# Patient Record
Sex: MALE
[De-identification: names, ages, dates, MRNs, and addresses within clinical notes are randomized per-mention and may not be internally consistent; named-entity substitution may affect disease eponyms.]

---

## 2017-07-22 ENCOUNTER — HOSPITAL ENCOUNTER (OUTPATIENT)
Dept: HOSPITAL 57 - BURLAB | Age: 57
Discharge: HOME | End: 2017-07-22
Attending: PHYSICIAN ASSISTANT
Payer: COMMERCIAL

## 2017-07-22 DIAGNOSIS — E78.2: Primary | ICD-10-CM

## 2017-07-22 LAB
ALBUMIN SERPL BCG-MCNC: 4.4 G/DL (ref 3.5–5)
ALP SERPL-CCNC: 144 U/L (ref 40–150)
ALT SERPL W P-5'-P-CCNC: 31 U/L (ref 8–55)
ANION GAP SERPL CALC-SCNC: 16 MMOL/L (ref 10–20)
AST SERPL-CCNC: 27 U/L (ref 5–34)
BASOPHILS # BLD AUTO: 0.1 THOU/UL (ref 0–0.2)
BASOPHILS NFR BLD AUTO: 1.9 % (ref 0–1)
BILIRUB SERPL-MCNC: 0.6 MG/DL (ref 0.2–1.2)
BUN SERPL-MCNC: 16 MG/DL (ref 8.4–25.7)
CALCIUM SERPL-MCNC: 9.9 MG/DL (ref 7.8–10.44)
CHD RISK SERPL-RTO: 4.4 (ref ?–4.5)
CHLORIDE SERPL-SCNC: 106 MMOL/L (ref 98–107)
CHOLEST SERPL-MCNC: 169 MG/DL
CO2 SERPL-SCNC: 26 MMOL/L (ref 22–29)
CREAT CL PREDICTED SERPL C-G-VRATE: 0 ML/MIN (ref 70–130)
EOSINOPHIL # BLD AUTO: 0.1 THOU/UL (ref 0–0.7)
EOSINOPHIL NFR BLD AUTO: 2.6 % (ref 0–10)
GLOBULIN SER CALC-MCNC: 2.6 G/DL (ref 2.4–3.5)
GLUCOSE SERPL-MCNC: 107 MG/DL (ref 70–105)
HDLC SERPL-MCNC: 38 MG/DL
HGB BLD-MCNC: 14.6 G/DL (ref 14–18)
LDLC SERPL CALC-MCNC: 90 MG/DL
LYMPHOCYTES # BLD AUTO: 1.2 THOU/UL (ref 1.2–3.4)
LYMPHOCYTES NFR BLD AUTO: 22.1 % (ref 21–51)
MCH RBC QN AUTO: 30 PG (ref 27–31)
MCV RBC AUTO: 86.2 FL (ref 80–94)
MONOCYTES # BLD AUTO: 0.4 THOU/UL (ref 0.11–0.59)
MONOCYTES NFR BLD AUTO: 6.7 % (ref 0–10)
NEUTROPHILS # BLD AUTO: 3.7 THOU/UL (ref 1.4–6.5)
NEUTROPHILS NFR BLD AUTO: 66.7 % (ref 42–75)
PLATELET # BLD AUTO: 241 THOU/UL (ref 130–400)
POTASSIUM SERPL-SCNC: 4.7 MMOL/L (ref 3.5–5.1)
RBC # BLD AUTO: 4.86 MILL/UL (ref 4.7–6.1)
SODIUM SERPL-SCNC: 143 MMOL/L (ref 136–145)
TRIGL SERPL-MCNC: 203 MG/DL (ref ?–150)
WBC # BLD AUTO: 5.5 THOU/UL (ref 4.8–10.8)

## 2017-07-22 PROCEDURE — 80061 LIPID PANEL: CPT

## 2017-07-22 PROCEDURE — 36415 COLL VENOUS BLD VENIPUNCTURE: CPT

## 2017-07-22 PROCEDURE — 85025 COMPLETE CBC W/AUTO DIFF WBC: CPT

## 2017-07-22 PROCEDURE — 84443 ASSAY THYROID STIM HORMONE: CPT

## 2017-07-22 PROCEDURE — 80053 COMPREHEN METABOLIC PANEL: CPT

## 2017-07-27 ENCOUNTER — HOSPITAL ENCOUNTER (OUTPATIENT)
Dept: HOSPITAL 57 - HPCALD | Age: 57
Discharge: HOME | End: 2017-07-27
Attending: PHYSICIAN ASSISTANT
Payer: COMMERCIAL

## 2017-07-27 DIAGNOSIS — Z12.5: Primary | ICD-10-CM

## 2017-07-27 PROCEDURE — G0103 PSA SCREENING: HCPCS

## 2017-07-27 PROCEDURE — 36415 COLL VENOUS BLD VENIPUNCTURE: CPT

## 2021-01-29 ENCOUNTER — HOSPITAL ENCOUNTER (INPATIENT)
Dept: HOSPITAL 92 - ERS | Age: 61
LOS: 9 days | Discharge: HOME | DRG: 871 | End: 2021-02-07
Attending: INTERNAL MEDICINE | Admitting: INTERNAL MEDICINE
Payer: COMMERCIAL

## 2021-01-29 ENCOUNTER — HOSPITAL ENCOUNTER (EMERGENCY)
Dept: HOSPITAL 57 - BURERS | Age: 61
Discharge: ANOTHER HOSPITAL | End: 2021-01-29
Payer: COMMERCIAL

## 2021-01-29 VITALS — BODY MASS INDEX: 34.8 KG/M2

## 2021-01-29 DIAGNOSIS — E11.22: ICD-10-CM

## 2021-01-29 DIAGNOSIS — Z79.899: ICD-10-CM

## 2021-01-29 DIAGNOSIS — E11.65: ICD-10-CM

## 2021-01-29 DIAGNOSIS — E66.9: ICD-10-CM

## 2021-01-29 DIAGNOSIS — I12.9: ICD-10-CM

## 2021-01-29 DIAGNOSIS — R65.20: ICD-10-CM

## 2021-01-29 DIAGNOSIS — A41.89: Primary | ICD-10-CM

## 2021-01-29 DIAGNOSIS — I10: ICD-10-CM

## 2021-01-29 DIAGNOSIS — Z79.84: ICD-10-CM

## 2021-01-29 DIAGNOSIS — J96.01: ICD-10-CM

## 2021-01-29 DIAGNOSIS — E78.5: ICD-10-CM

## 2021-01-29 DIAGNOSIS — Z98.2: ICD-10-CM

## 2021-01-29 DIAGNOSIS — U07.1: Primary | ICD-10-CM

## 2021-01-29 DIAGNOSIS — E87.5: ICD-10-CM

## 2021-01-29 DIAGNOSIS — Z79.82: ICD-10-CM

## 2021-01-29 DIAGNOSIS — U07.1: ICD-10-CM

## 2021-01-29 DIAGNOSIS — E11.9: ICD-10-CM

## 2021-01-29 DIAGNOSIS — R06.03: ICD-10-CM

## 2021-01-29 DIAGNOSIS — N18.2: ICD-10-CM

## 2021-01-29 DIAGNOSIS — J12.82: ICD-10-CM

## 2021-01-29 LAB
ALBUMIN SERPL BCG-MCNC: 4.1 G/DL (ref 3.5–5)
ALP SERPL-CCNC: 105 U/L (ref 40–110)
ALT SERPL W P-5'-P-CCNC: 17 U/L (ref 8–55)
ANION GAP SERPL CALC-SCNC: 16 MMOL/L (ref 10–20)
APTT PPP: 34.9 SEC (ref 22.9–36.1)
AST SERPL-CCNC: 27 U/L (ref 5–34)
BASOPHILS # BLD AUTO: 0.1 THOU/UL (ref 0–0.2)
BASOPHILS NFR BLD AUTO: 0.7 % (ref 0–1)
BILIRUB SERPL-MCNC: 0.8 MG/DL (ref 0.2–1.2)
BUN SERPL-MCNC: 23 MG/DL (ref 8.4–25.7)
CALCIUM SERPL-MCNC: 9.3 MG/DL (ref 7.8–10.44)
CHLORIDE SERPL-SCNC: 99 MMOL/L (ref 98–107)
CO2 SERPL-SCNC: 25 MMOL/L (ref 22–29)
CREAT CL PREDICTED SERPL C-G-VRATE: 0 ML/MIN (ref 70–130)
D DIMER PPP FEU-MCNC: 1.68 *MCG/ML (ref 0.27–0.43)
EOSINOPHIL # BLD AUTO: 0 THOU/UL (ref 0–0.7)
EOSINOPHIL NFR BLD AUTO: 0.1 % (ref 0–10)
GLOBULIN SER CALC-MCNC: 2.9 G/DL (ref 2.4–3.5)
GLUCOSE SERPL-MCNC: 126 MG/DL (ref 70–105)
HGB BLD-MCNC: 13.2 G/DL (ref 14–18)
INR PPP: 1.1
LYMPHOCYTES # BLD AUTO: 0.7 THOU/UL (ref 1.2–3.4)
LYMPHOCYTES NFR BLD AUTO: 5.5 % (ref 21–51)
MCH RBC QN AUTO: 29.1 PG (ref 27–31)
MCV RBC AUTO: 87.2 FL (ref 78–98)
MONOCYTES # BLD AUTO: 0.4 THOU/UL (ref 0.11–0.59)
MONOCYTES NFR BLD AUTO: 3.6 % (ref 0–10)
NEUTROPHILS # BLD AUTO: 10.7 THOU/UL (ref 1.4–6.5)
NEUTROPHILS NFR BLD AUTO: 90.2 % (ref 42–75)
PLATELET # BLD AUTO: 216 THOU/UL (ref 130–400)
POTASSIUM SERPL-SCNC: 3.6 MMOL/L (ref 3.5–5.1)
PROTHROMBIN TIME: 14.6 SEC (ref 12–14.7)
RBC # BLD AUTO: 4.55 MILL/UL (ref 4.7–6.1)
SODIUM SERPL-SCNC: 136 MMOL/L (ref 136–145)
WBC # BLD AUTO: 11.8 THOU/UL (ref 4.8–10.8)

## 2021-01-29 PROCEDURE — 96374 THER/PROPH/DIAG INJ IV PUSH: CPT

## 2021-01-29 PROCEDURE — 36416 COLLJ CAPILLARY BLOOD SPEC: CPT

## 2021-01-29 PROCEDURE — 83605 ASSAY OF LACTIC ACID: CPT

## 2021-01-29 PROCEDURE — 85379 FIBRIN DEGRADATION QUANT: CPT

## 2021-01-29 PROCEDURE — 85025 COMPLETE CBC W/AUTO DIFF WBC: CPT

## 2021-01-29 PROCEDURE — 80076 HEPATIC FUNCTION PANEL: CPT

## 2021-01-29 PROCEDURE — 71045 X-RAY EXAM CHEST 1 VIEW: CPT

## 2021-01-29 PROCEDURE — 96375 TX/PRO/DX INJ NEW DRUG ADDON: CPT

## 2021-01-29 PROCEDURE — 80053 COMPREHEN METABOLIC PANEL: CPT

## 2021-01-29 PROCEDURE — 93005 ELECTROCARDIOGRAM TRACING: CPT

## 2021-01-29 PROCEDURE — 82728 ASSAY OF FERRITIN: CPT

## 2021-01-29 PROCEDURE — 87040 BLOOD CULTURE FOR BACTERIA: CPT

## 2021-01-29 PROCEDURE — 85610 PROTHROMBIN TIME: CPT

## 2021-01-29 PROCEDURE — 71275 CT ANGIOGRAPHY CHEST: CPT

## 2021-01-29 PROCEDURE — 84484 ASSAY OF TROPONIN QUANT: CPT

## 2021-01-29 PROCEDURE — 86140 C-REACTIVE PROTEIN: CPT

## 2021-01-29 PROCEDURE — 85730 THROMBOPLASTIN TIME PARTIAL: CPT

## 2021-01-29 PROCEDURE — 36415 COLL VENOUS BLD VENIPUNCTURE: CPT

## 2021-01-29 PROCEDURE — 80048 BASIC METABOLIC PNL TOTAL CA: CPT

## 2021-01-29 PROCEDURE — 83735 ASSAY OF MAGNESIUM: CPT

## 2021-01-30 PROCEDURE — 8E0ZXY6 ISOLATION: ICD-10-PCS | Performed by: EMERGENCY MEDICINE

## 2021-01-30 PROCEDURE — XW033E5 INTRODUCTION OF REMDESIVIR ANTI-INFECTIVE INTO PERIPHERAL VEIN, PERCUTANEOUS APPROACH, NEW TECHNOLOGY GROUP 5: ICD-10-PCS | Performed by: INTERNAL MEDICINE

## 2021-01-30 RX ADMIN — ASPIRIN 81 MG CHEWABLE TABLET SCH MG: 81 TABLET CHEWABLE at 09:10

## 2021-01-30 RX ADMIN — Medication SCH MG: at 09:10

## 2021-01-30 RX ADMIN — ALBUTEROL SULFATE SCH PUFF: 108 INHALANT RESPIRATORY (INHALATION) at 17:37

## 2021-01-30 RX ADMIN — ALBUTEROL SULFATE SCH: 108 INHALANT RESPIRATORY (INHALATION) at 17:37

## 2021-01-30 RX ADMIN — ALBUTEROL SULFATE SCH PUFF: 108 INHALANT RESPIRATORY (INHALATION) at 22:30

## 2021-01-30 RX ADMIN — INSULIN LISPRO PRN UNITS: 100 INJECTION, SOLUTION INTRAVENOUS; SUBCUTANEOUS at 05:43

## 2021-01-30 RX ADMIN — Medication SCH UNITS: at 09:10

## 2021-01-30 NOTE — CT
CT ANGIO OF THE CHEST:

 

DATE: 1/29/2021.

 

FINDINGS: 

Spiral CT of the chest was done on this patient with shortness of breath, elevated D-dimer, and a pos
itive COVID test.  

 

Diffuse moderately severe ground-glass and confluent infiltrates are present bilaterally.  These are 
seen in all lobes, a little denser in the lung bases and elsewhere, but there is also some extensive 
coverage in the right upper lobe.  No effusions are seen.

 

There is moderately good opacification of the pulmonary arteries.  No emboli were seen throughout the
 primary and secondary branches of the pulmonary arteries.  As one gets much further out than that, t
he sensitivity of the study decreases.  There is no sign of aortic aneurysm or dissection.  No perica
rdial effusion was seen.  A small hiatal hernia was suggested.  The visible potions of the upper abdo
men showed no acute findings.  

 

IMPRESSION: 

1.  Moderate sensitivity study with no evidence of pulmonary embolism in the larger branches.

 

2.  Diffuse moderately severe parenchymal infiltrates consistent with a known COVID diagnosis.

 

Findings discussed with Dr. Mcallister at 2153 on 1/29/2021.

 

CODE CR

 

POS: HOME

## 2021-01-30 NOTE — RAD
PORTABLE CHEST:

 

DATE: 1/29/2021.

 

FINDINGS: 

Patchy infiltrates are present throughout the lungs bilaterally, a little denser in the lower lobes t
oropeza upper.  The pattern is consistent with the known diagnosis of COVID.  There are no effusions.  Th
e heart is minimally enlarged, but there is no vascular congestion or edema.  

 

IMPRESSION: 

Diffuse pulmonary infiltrates.

 

POS: HOME

## 2021-01-30 NOTE — CON
DATE OF CONSULTATION:  01/30/2021



REASON FOR CONSULT:  COVID-19.



HISTORY OF PRESENT ILLNESS:  A 60-year-old, looks like first admission to this

hospital who has a history of obesity, type 2 diabetes, hyperlipidemia,

hypertension, and has been positive and sick for about 7 days before admission.  He

initially did well but then 2 days before admission, his saturations dropped to the

80s, so he was brought in.  He is currently receiving Decadron, azithromycin,

Rocephin, remdesivir.  He was sitting by the window, saturating at around 90%, and

he is little bit tachypneic at rest, coughing intermittently, typical COVID

symptoms.  He is oriented follows commands.  Sometimes his speech is a little bit

halting because of breathlessness.  He denies headaches.  No shortness of breath.

Little bit of loss of sense of smell.  No sore throat, odynophagia, or dysphagia.

No chest pain.  No abdominal pain or diarrhea.  No genitourinary symptoms.  No joint

symptoms. 



PAST MEDICAL HISTORY:  Obesity, diabetes type 2, hypertension.



FAMILY HISTORY:  Noncontributory.



ALLERGIES:  NONE.



MEDICATIONS:  

1. Remdesivir.

2. Decadron.

3. Azithromycin.

4. Rocephin.

5. Some other p.r.n. medications.



PHYSICAL EXAMINATION:

VITAL SIGNS:  The temperature is normal, /62, heart rate 68, respirations 17,

O2 saturation 93% to 96%.  He will desaturate easily down to 85 to 87 upon the

slightest exercise.  When he is lying on his left lateral decubitus, his best sats

are around 91 to 92.  In the recumbent position, they go up to 95, and in the right

lateral decubitus, they go up to 98% to 100%. 

HEENT:  His ocular movements are conjugate.  Oral cavity normal. 

LUNGS:  With a few scattered inspiratory crackles up to a third of right and left

hemithorax.  No wheezing. 

HEART:  S1, S2.  Regular rate.  No S3 or S4. 

ABDOMEN:  Soft, not distended or tender.  No ascites.  No bladder distention.  No

edema. 

EXTREMITIES:  No joint inflammatory activity.  Moves all extremities equally. 

NEUROLOGIC:  Cognitive function appears to be intact.



LABORATORY DATA:  White cell count 11.8, hemoglobin 13, platelets 216 with 90%

neutrophils.  D-dimer 1.68.  Creatinine 1.15, glucose 126.  Liver profile normal.

SARS-CoV2 was positive on the 22nd. 



ASSESSMENT:  Type 2 diabetes, hypertension and moderate to severe SARS-CoV2

pneumonia.  Continue remdesivir, Decadron.  Discontinue azithromycin and Rocephin.

Continue O2 supplementation.  He is right now at 5 L/minute and hopefully he will

turn around.  Measure CRP and D-dimer every other day and basic metabolic panel.

Liver functions every other day. 







Job ID:  [id number]

## 2021-01-30 NOTE — PDOC.HHP
Hospitalist HPI


shortness of breath


History of Present Illness: 





patient is a 60 year old male with PMH HTN, DM who presents as transfer for 

covid and hypoxia. patient diagnosed with covid on 1/22 and was doing well until

today, he developed shortness of breath, in ED satting 88% on RA required 2OL by

NC to make sats over 90, given steroids and antibiotics and transferred here for

further workup and care. 


Allergies/Adverse Reactions: 


                                        











Allergy/AdvReac Type Severity Reaction Status Date / Time


 


No Known Allergies Allergy   Unverified 01/30/21 01:31











Home Medications: 


                                        











 Medication  Instructions  Recorded  Confirmed  Type


 


Aspirin 81 mg PO DAILY 01/30/21 01/30/21 History


 


Atorvastatin Calcium 10 mg PO DAILY 01/30/21 01/30/21 History


 


Lisinopril 10 mg PO DAILY 01/30/21 01/30/21 History


 


metFORMIN [Glucophage] 500 mg PO DAILY 01/30/21 01/30/21 History











Past History: 


PMHx:   DM, HTN, HLD





PSHx:   colonoscopy





FHx:   reviewed and no significant FH to this care





Social:


Patient drinks socially


rarely


Patient denies drug use


Patient has no smoking history


Lives at home


with family.





Hospitalist HPI ROS


Constitutional: denies: fever, chills, sweats, weakness, malaise, other


Eyes: denies: pain, vision change, conjunctivae inflammation, eyelid 

inflammation, redness, other


ENT: denies: ear pain, ear discharge, nose pain, nose discharge, nose conge

stion, mouth pain, mouth swelling, throat pain, throat swelling, other


Respiratory: reports: shortness of breath.  denies: cough, dry, hemoptysis, SOB 

with excertion, pleuritic pain, sputum, wheezing, other


Cardiovascular: denies: chest pain, palpitations, orthopnea, paroxysmal noc. 

dyspnea, edema, light headedness, other


Gastrointestinal: denies: nausea, vomiting, abdominal pain, diarrhea, 

constipation, melena, hematochezia, other


Genitourinary: denies: dysuria, frequency, incontinence, hematuria, retention, 

other


Musculoskeletal: denies: neck pain, shoulder pain, arm pain, back pain, hand 

pain, leg pain, foot pain, other


Skin: denies: rash, lesions, ni, bruising, other


Neurological: denies: weakness, numbness, incoordination, change in speech, 

confusion, seizures, other


All other systems reviewed; all pertinent +/- noted in HPI/Subj





Hospitalist Exam


Vitals: 


                             Vital Signs (12 hours)











  Temp Pulse Resp Pulse Ox


 


 01/30/21 01:52     95


 


 01/30/21 01:48     95


 


 01/30/21 01:29  97.2 F L  65  18  95








                                     Weight











Weight                         209 lb 7.04 oz














General Appearance: NAD, awake alert


Eye: PERRL, anicteric sclera


ENT: normocephalic atraumatic, no oropharyngeal lesions, moist mucosa


Neck: supple, symmetric, no JVD, no thyromegaly, no lymphadenopathy, no carotid 

bruit


Heart: RRR, no murmur, no gallops, no rubs, normal peripheral pulses


Respiratory: CTAB, no wheezes, no rales, no ronchi, normal chest expansion, no 

tachypnea, normal percussion


Gastrointestinal: soft, non-tender, non-distended, normal bowel sounds, no 

palpable masses, no hepatomegaly, no splenomegaly, no bruit


Extremities: no cyanosis, no clubbing, no edema


Skin: normal turgor, no lesions, no rashes


Neurological: cranial nerve grossly intact, normal sensation to touch, no 

weakness, no focal deficits, no new deficit


Musculoskeletal: normal tone, normal strength, no muscle wasting


Psychiatric: normal affect, normal behavior, A&O x 3





Hospitalist Results


Lab results: 


                             Laboratory Last Values











POC Glucose  187 mg/dL ()  H  01/30/21  01:34    











Additional comment: 





ED documents from here and jb reviewed, and labs and imaging reports





Hospitalist H&P A/P


Plan: 


patient is a 60 year old male with PMH HTN, DM who presents as transfer for 

covid and hypoxia. patient diagnosed with covid on 1/22 and was doing well until

 today, he developed shortness of breath, in ED satting 88% on RA required 2OL 

by NC to make sats over 90, given steroids and antibiotics and transferred here 

for further workup and care. 





# covid 19 pneumonia


# hypoxia


- admit to floor


- continue on O2, monitor for worsening


- antibiotics, vitamins, pepcid, lovenox, steroids





# HTN - continue home meds and PRN meds for breakthrough





# DM w/ hyperglycemia - steroids playing a role in out of control numbers, 

continue SSI and hold PO DM meds








DVT/GI ppx

## 2021-01-31 LAB
ALBUMIN SERPL BCG-MCNC: 3.4 G/DL (ref 3.5–5)
ALP SERPL-CCNC: 95 U/L (ref 40–110)
ALT SERPL W P-5'-P-CCNC: 16 U/L (ref 8–55)
ANION GAP SERPL CALC-SCNC: 13 MMOL/L (ref 10–20)
AST SERPL-CCNC: 23 U/L (ref 5–34)
BILIRUB DIRECT SERPL-MCNC: 0.2 MG/DL (ref 0.1–0.3)
BILIRUB SERPL-MCNC: 0.5 MG/DL (ref 0.2–1.2)
BUN SERPL-MCNC: 23 MG/DL (ref 8.4–25.7)
CALCIUM SERPL-MCNC: 8.9 MG/DL (ref 7.8–10.44)
CHLORIDE SERPL-SCNC: 107 MMOL/L (ref 98–107)
CO2 SERPL-SCNC: 22 MMOL/L (ref 22–29)
CREAT CL PREDICTED SERPL C-G-VRATE: 114 ML/MIN (ref 70–130)
GLUCOSE SERPL-MCNC: 166 MG/DL (ref 70–105)
HGB BLD-MCNC: 12.4 G/DL (ref 14–18)
MAGNESIUM SERPL-MCNC: 2.3 MG/DL (ref 1.6–2.6)
MCH RBC QN AUTO: 28.7 PG (ref 27–31)
MCV RBC AUTO: 87.6 FL (ref 78–98)
MDIFF COMPLETE?: YES
PLATELET # BLD AUTO: 299 THOU/UL (ref 130–400)
POTASSIUM SERPL-SCNC: 4.1 MMOL/L (ref 3.5–5.1)
RBC # BLD AUTO: 4.3 MILL/UL (ref 4.7–6.1)
SODIUM SERPL-SCNC: 138 MMOL/L (ref 136–145)
WBC # BLD AUTO: 17.3 THOU/UL (ref 4.8–10.8)

## 2021-01-31 RX ADMIN — REMDESIVIR SCH MLS: 100 INJECTION, POWDER, LYOPHILIZED, FOR SOLUTION INTRAVENOUS at 17:22

## 2021-01-31 RX ADMIN — ALBUTEROL SULFATE SCH PUFF: 108 INHALANT RESPIRATORY (INHALATION) at 17:22

## 2021-01-31 RX ADMIN — Medication SCH MG: at 09:08

## 2021-01-31 RX ADMIN — THERA TABS SCH TAB: TAB at 09:08

## 2021-01-31 RX ADMIN — INSULIN LISPRO PRN UNITS: 100 INJECTION, SOLUTION INTRAVENOUS; SUBCUTANEOUS at 12:09

## 2021-01-31 RX ADMIN — Medication SCH UNITS: at 09:08

## 2021-01-31 RX ADMIN — ALBUTEROL SULFATE SCH PUFF: 108 INHALANT RESPIRATORY (INHALATION) at 02:30

## 2021-01-31 RX ADMIN — ALBUTEROL SULFATE SCH PUFF: 108 INHALANT RESPIRATORY (INHALATION) at 22:50

## 2021-01-31 RX ADMIN — ASPIRIN 81 MG CHEWABLE TABLET SCH MG: 81 TABLET CHEWABLE at 09:08

## 2021-01-31 RX ADMIN — ALBUTEROL SULFATE SCH PUFF: 108 INHALANT RESPIRATORY (INHALATION) at 11:00

## 2021-01-31 RX ADMIN — ALBUTEROL SULFATE SCH PUFF: 108 INHALANT RESPIRATORY (INHALATION) at 06:41

## 2021-01-31 RX ADMIN — ALBUTEROL SULFATE SCH: 108 INHALANT RESPIRATORY (INHALATION) at 17:22

## 2021-01-31 NOTE — PDOC.HOSPP
- Subjective


Encounter Date: 01/31/21


Encounter Time: 14:00


Subjective: 


Patient seen and examined for respiratory failure due to COVID 19 pneumonia.  

Short of breath on mild-to-moderate exertion.  Denies any fever or chills.





- Objective


Vital Signs & Weight: 


                             Vital Signs (12 hours)











  Temp Pulse Resp BP Pulse Ox


 


 01/31/21 20:00  99.4 F  84  20  130/69  95


 


 01/31/21 15:39  98.3 F  68  17  150/70 H  95


 


 01/31/21 11:35  98.6 F  80  16  133/64  97








                                     Weight











Weight                         209 lb 7.04 oz














Result Diagrams: 


                                 01/31/21 07:17





                                 01/31/21 07:17


Additional Labs: 


                                   Accuchecks











  01/31/21 01/31/21 01/31/21





  15:40 11:37 05:16


 


POC Glucose  110 H  226 H  158 H














  01/30/21





  20:29


 


POC Glucose  209 H








                                        





Abnormal Lab Results - Last 48 hrs





01/31/21 07:17: Albumin 3.4 L


01/31/21 07:17: WBC 17.3 H, RBC 4.30 L, Hgb 12.4 L, Hct 37.7 L, Neutrophils % 

(Manual) 87 H, Band Neuts % (Manual) 4 L, Lymphocytes % (Manual) 8 L


01/31/21 07:17: C-Reactive Protein 8.40 H


01/31/21 07:17: Ferritin 923.03 H


01/31/21 07:17: D-Dimer 1.17 H








Radiology Reviewed by me: Yes (CTA chest - pneumonia)





Hospitalist ROS





- Review of Systems


Cardiovascular: denies: chest pain, palpitations, orthopnea, paroxysmal noc. 

dyspnea, edema, light headedness, other


Gastrointestinal: denies: nausea, vomiting, abdominal pain, diarrhea, 

constipation, melena, hematochezia, other





- Medication


Medications: 


Active Medications











Generic Name Dose Route Start Last Admin





  Trade Name Freq  PRN Reason Stop Dose Admin


 


Albuterol Sulfate  2 puff  01/30/21 14:30  01/31/21 17:22





  Albuterol 200 Puff (6.7gm Inhaler)  INH   2 puff





  [id number] JYOTI   Administration


 


Ascorbic Acid  500 mg  01/30/21 09:00  01/31/21 09:08





  Ascorbic Acid 500 Mg Chewable Tablet  PO   500 mg





  DAILY JYOTI   Administration


 


Aspirin  81 mg  01/30/21 09:00  01/31/21 09:08





  Aspirin Chewable 81 Mg Tab  PO   81 mg





  DAILY JYOTI   Administration


 


Atorvastatin Calcium  10 mg  01/30/21 09:00  01/31/21 09:08





  Atorvastatin Calcium 10 Mg Tab  PO   10 mg





  DAILY JYOTI   Administration


 


Cholecalciferol  2,000 units  01/30/21 09:00  01/31/21 09:08





  Cholecalciferol 1,000 Units (25 Mcg) Tab  PO   2,000 units





  DAILY JYOTI   Administration


 


Dexamethasone  6 mg  01/30/21 21:00  01/30/21 20:14





  Dexamethasone 4 Mg/Ml Vial  SLOW IVP   6 mg





  HS JYOTI   Administration


 


Enoxaparin Sodium  40 mg  01/30/21 21:00  01/31/21 09:08





  Enoxaparin Sodium 40 Mg/0.4 Ml Syringe  SC   40 mg





  0900,2100 JYOTI   Administration


 


Remdesivir 100 mg/ Sodium  250 mls @ 250 mls/hr  01/31/21 16:00  01/31/21 17:22





  Chloride  IV  02/03/21 16:59  250 mls





  1600 JYOTI   Administration


 


Insulin Human Lispro  0 units  01/30/21 02:48  01/31/21 12:09





  Humalog 300 Units/3 Ml Vial  SC   3 units





  .MILD SLIDING SCALE PRN   Administration





  Mild Correctional Scale  


 


Insulin Human Lispro  0 units  01/30/21 12:51  01/30/21 20:56





  Humalog 300 Units/3 Ml Vial  SC   2 unit





  .BEDTIME SLIDING SC PRN   Administration





  Bedtime Correctional Scale  


 


Multivitamins  1 tab  01/31/21 09:00  01/31/21 09:08





  Multivit, Therapeutic 1 Tab  PO   1 tab





  DAILY JYOTI   Administration


 


Zinc Sulfate  220 mg  01/30/21 09:00  01/31/21 09:08





  Zinc Sulfate 220 Mg Cap  PO   220 mg





  DAILY JYOTI   Administration














Hospitalist Exam


Vitals: 


                             Vital Signs (12 hours)











  Temp Pulse Resp BP Pulse Ox


 


 01/31/21 20:00  99.4 F  84  20  130/69  95


 


 01/31/21 15:39  98.3 F  68  17  150/70 H  95


 


 01/31/21 11:35  98.6 F  80  16  133/64  97








                                     Weight











Weight                         209 lb 7.04 oz














General Appearance: awake alert


Neck: supple, no JVD


Heart: RRR, no gallops, no rubs, normal peripheral pulses


Respiratory: normal chest expansion, rales, rhonchi, tachypneic


Gastrointestinal: soft, normal bowel sounds, no guarding, no rigidity


Extremities: no cyanosis, no clubbing


Neurological: no new deficit


Musculoskeletal: generalized weakness


Psychiatric: normal affect, A&O x 3





Hosp A/P


(1) Acute respiratory failure with hypoxia


Code(s): J96.01 - ACUTE RESPIRATORY FAILURE WITH HYPOXIA   Status: Acute   





(2) Pneumonia due to COVID-19 virus


Code(s): U07.1 - COVID-19; J12.82 - PNEUMONIA DUE TO CORONAVIRUS DISEASE 2019   

Status: Acute   





(3) Severe sepsis


Code(s): A41.9 - SEPSIS, UNSPECIFIED ORGANISM; R65.20 - SEVERE SEPSIS WITHOUT 

SEPTIC SHOCK   Status: Suspected   





(4) Diabetes mellitus, type II


Status: Chronic   


Qualifiers: 


   Chronic kidney disease stage: stage 2 (mild) 





(5) Obesity (BMI 30-39.9)


Code(s): E66.9 - OBESITY, UNSPECIFIED   Status: Chronic   





(6) Hypertension


Code(s): I10 - ESSENTIAL (PRIMARY) HYPERTENSION   Status: Chronic   





- Plan


DVT proph w/lovenox, DVT proph w/SCDs





Patient is requiring 5 L O2 nasal cannula.  Infectious disease input 

appreciated.  Antibiotics discontinued.  Will monitor d-dimer and CRP every 

other day.  Continue supportive care.  Continue bronchodilators with Remdesivir 

and Decadron.    Continue COVID 19 isolation.  Plan discussed with the patient. 

Continue Lovenox for DVT prophylaxis.

## 2021-02-01 RX ADMIN — ASPIRIN 81 MG CHEWABLE TABLET SCH MG: 81 TABLET CHEWABLE at 09:09

## 2021-02-01 RX ADMIN — REMDESIVIR SCH MLS: 100 INJECTION, POWDER, LYOPHILIZED, FOR SOLUTION INTRAVENOUS at 17:04

## 2021-02-01 RX ADMIN — ALBUTEROL SULFATE SCH PUFF: 108 INHALANT RESPIRATORY (INHALATION) at 09:12

## 2021-02-01 RX ADMIN — ALBUTEROL SULFATE SCH PUFF: 108 INHALANT RESPIRATORY (INHALATION) at 22:38

## 2021-02-01 RX ADMIN — Medication SCH UNITS: at 09:10

## 2021-02-01 RX ADMIN — ALBUTEROL SULFATE SCH PUFF: 108 INHALANT RESPIRATORY (INHALATION) at 14:30

## 2021-02-01 RX ADMIN — ALBUTEROL SULFATE SCH PUFF: 108 INHALANT RESPIRATORY (INHALATION) at 06:08

## 2021-02-01 RX ADMIN — THERA TABS SCH TAB: TAB at 09:09

## 2021-02-01 RX ADMIN — ALBUTEROL SULFATE SCH PUFF: 108 INHALANT RESPIRATORY (INHALATION) at 02:25

## 2021-02-01 RX ADMIN — Medication SCH MG: at 09:09

## 2021-02-01 RX ADMIN — ALBUTEROL SULFATE SCH PUFF: 108 INHALANT RESPIRATORY (INHALATION) at 17:56

## 2021-02-01 NOTE — PRG
DATE OF SERVICE:  02/01/2021



SUBJECTIVE:  He is about the same as when I saw him 2 days ago.  He desaturates

pretty quickly when he starts to doing things, and then when he rests, he goes up to

98 to 100, particularly on his right lateral decubitus.  He is breathing at 26 times

a minute.  He is at 5 L and saturating anywhere from 93 to 100, depending on the

amount of activity. 



OBJECTIVE:  LUNGS:  With a few crackles, but not much, they not very intense and

kind of sparsely distributed. 

HEART:  S1 and S2, regular rate. 

ABDOMEN:  Soft and not distended. 

EXTREMITIES:  Moves all extremities equally.  No edema. 

NEUROLOGIC:  Cognitive function appears to be intact.



LABORATORY DATA:  We do not have any new labs.  The CRP needs to be repeated, I

think tomorrow it will be repeated.  He is currently on remdesivir and Decadron. 



ASSESSMENT AND DISCUSSION:  Type 2 diabetes, hypertension, obesity, and moderate to

severe SARS-CoV-2 pneumonia, on remdesivir and Decadron, this is the 12th day of

illness, and he may be making some improvement, but again it is hard to tell with

this illness.  He is still requiring quite a bit of O2 with low-flow cannula. 







Job ID:  [id number]

## 2021-02-02 LAB
ALBUMIN SERPL BCG-MCNC: 3.2 G/DL (ref 3.5–5)
ALP SERPL-CCNC: 91 U/L (ref 40–110)
ALT SERPL W P-5'-P-CCNC: 15 U/L (ref 8–55)
ANION GAP SERPL CALC-SCNC: 11 MMOL/L (ref 10–20)
AST SERPL-CCNC: 21 U/L (ref 5–34)
BILIRUB SERPL-MCNC: 0.7 MG/DL (ref 0.2–1.2)
BUN SERPL-MCNC: 16 MG/DL (ref 8.4–25.7)
CALCIUM SERPL-MCNC: 8.9 MG/DL (ref 7.8–10.44)
CHLORIDE SERPL-SCNC: 104 MMOL/L (ref 98–107)
CO2 SERPL-SCNC: 26 MMOL/L (ref 22–29)
CREAT CL PREDICTED SERPL C-G-VRATE: 116 ML/MIN (ref 70–130)
CRP SERPL-MCNC: 12.85 MG/DL
GLOBULIN SER CALC-MCNC: 3.1 G/DL (ref 2.4–3.5)
GLUCOSE SERPL-MCNC: 160 MG/DL (ref 70–105)
HGB BLD-MCNC: 12 G/DL (ref 14–18)
MCH RBC QN AUTO: 29.8 PG (ref 27–31)
MCV RBC AUTO: 89.6 FL (ref 78–98)
MDIFF COMPLETE?: YES
PLATELET # BLD AUTO: 301 THOU/UL (ref 130–400)
POTASSIUM SERPL-SCNC: 4.7 MMOL/L (ref 3.5–5.1)
RBC # BLD AUTO: 4.02 MILL/UL (ref 4.7–6.1)
SODIUM SERPL-SCNC: 136 MMOL/L (ref 136–145)
WBC # BLD AUTO: 11.8 THOU/UL (ref 4.8–10.8)

## 2021-02-02 RX ADMIN — ALBUTEROL SULFATE SCH PUFF: 108 INHALANT RESPIRATORY (INHALATION) at 02:31

## 2021-02-02 RX ADMIN — THERA TABS SCH TAB: TAB at 08:00

## 2021-02-02 RX ADMIN — ALBUTEROL SULFATE SCH PUFF: 108 INHALANT RESPIRATORY (INHALATION) at 20:41

## 2021-02-02 RX ADMIN — ALBUTEROL SULFATE SCH PUFF: 108 INHALANT RESPIRATORY (INHALATION) at 06:00

## 2021-02-02 RX ADMIN — Medication SCH MG: at 08:00

## 2021-02-02 RX ADMIN — ALBUTEROL SULFATE SCH PUFF: 108 INHALANT RESPIRATORY (INHALATION) at 13:58

## 2021-02-02 RX ADMIN — ALBUTEROL SULFATE SCH PUFF: 108 INHALANT RESPIRATORY (INHALATION) at 18:09

## 2021-02-02 RX ADMIN — REMDESIVIR SCH MLS: 100 INJECTION, POWDER, LYOPHILIZED, FOR SOLUTION INTRAVENOUS at 16:10

## 2021-02-02 RX ADMIN — INSULIN LISPRO PRN UNITS: 100 INJECTION, SOLUTION INTRAVENOUS; SUBCUTANEOUS at 06:00

## 2021-02-02 RX ADMIN — GUAIFENESIN AND DEXTROMETHORPHAN PRN ML: 100; 10 SYRUP ORAL at 08:00

## 2021-02-02 RX ADMIN — Medication SCH UNITS: at 08:00

## 2021-02-02 RX ADMIN — ALBUTEROL SULFATE SCH PUFF: 108 INHALANT RESPIRATORY (INHALATION) at 10:07

## 2021-02-02 RX ADMIN — ASPIRIN 81 MG CHEWABLE TABLET SCH MG: 81 TABLET CHEWABLE at 08:00

## 2021-02-02 NOTE — PDOC.HOSPP
- Subjective


Encounter Date: 02/02/21


Encounter Time: 16:00


Subjective: 


Patient seen and examined for pneumonia/respiratory failure.  No significant 

change in shortness of breath.  Denies any fever or chills





- Objective


Vital Signs & Weight: 


                             Vital Signs (12 hours)











  Temp Pulse Resp BP Pulse Ox


 


 02/02/21 21:26  99.4 F  85  20  135/70  97








                                     Weight











Weight                         209 lb 7.04 oz














Result Diagrams: 


                                 02/02/21 05:26





                                 02/02/21 05:26


Additional Labs: 


                                   Accuchecks











  02/02/21 02/02/21 02/02/21





  20:36 11:53 05:30


 


POC Glucose  198 H  102 H  152 H








                                        





Abnormal Lab Results - Last 48 hrs





02/02/21 05:26: C-Reactive Protein 12.85 H, Albumin 3.2 L, Albumin/Globulin 

Ratio 1.0 L


02/02/21 05:26: WBC 11.8 H, RBC 4.02 L, Hgb 12.0 L, Hct 36.0 L, Neutrophils % 

(Manual) 81 H, Band Neuts % (Manual) 2 L, Lymphocytes % (Manual) 9 L, Myelocytes

% 2 H


02/02/21 05:26: D-Dimer 0.80 H











Hospitalist ROS





- Review of Systems


Cardiovascular: denies: chest pain, palpitations, orthopnea, paroxysmal noc. 

dyspnea, edema, light headedness, other


Gastrointestinal: denies: nausea, vomiting, abdominal pain, diarrhea, 

constipation, melena, hematochezia, other





- Medication


Medications: 


Active Medications











Generic Name Dose Route Start Last Admin





  Trade Name Freq  PRN Reason Stop Dose Admin


 


Acetaminophen  650 mg  01/30/21 02:34  02/02/21 20:40





  Acetaminophen 325 Mg Tab  PO   650 mg





  Q4H PRN   Administration





  Headache/Fever/Mild Pain (1-3)  


 


Albuterol Sulfate  2 puff  01/30/21 14:30  02/02/21 20:41





  Albuterol 200 Puff (6.7gm Inhaler)  INH   2 puff





  [id number] JYOTI   Administration


 


Ascorbic Acid  500 mg  01/30/21 09:00  02/02/21 08:00





  Ascorbic Acid 500 Mg Chewable Tablet  PO   500 mg





  DAILY JYOTI   Administration


 


Aspirin  81 mg  01/30/21 09:00  02/02/21 08:00





  Aspirin Chewable 81 Mg Tab  PO   81 mg





  DAILY JYOTI   Administration


 


Atorvastatin Calcium  10 mg  01/30/21 09:00  02/02/21 08:00





  Atorvastatin Calcium 10 Mg Tab  PO   10 mg





  DAILY JYOTI   Administration


 


Cholecalciferol  2,000 units  01/30/21 09:00  02/02/21 08:00





  Cholecalciferol 1,000 Units (25 Mcg) Tab  PO   2,000 units





  DAILY JYOTI   Administration


 


Dexamethasone  6 mg  01/30/21 21:00  02/02/21 20:38





  Dexamethasone 4 Mg/Ml Vial  SLOW IVP   6 mg





  HS JYOTI   Administration


 


Enoxaparin Sodium  40 mg  01/30/21 21:00  02/02/21 20:39





  Enoxaparin Sodium 40 Mg/0.4 Ml Syringe  SC   40 mg





  0900,2100 JYOTI   Administration


 


Guaifenesin/Dextromethorphan  15 ml  01/30/21 02:34  02/02/21 08:00





  Guaifenesin Dm 100-10/5 Ml Udcup  PO   15 ml





  Q4H PRN   Administration





  Cough  


 


Remdesivir 100 mg/ Sodium  250 mls @ 250 mls/hr  01/31/21 16:00  02/02/21 16:10





  Chloride  IV  02/03/21 16:59  250 mls





  1600 JYOTI   Administration


 


Insulin Human Lispro  0 units  01/30/21 02:48  02/02/21 06:00





  Humalog 300 Units/3 Ml Vial  SC   2 units





  .MILD SLIDING SCALE PRN   Administration





  Mild Correctional Scale  


 


Insulin Human Lispro  0 units  01/30/21 12:51  01/30/21 20:56





  Humalog 300 Units/3 Ml Vial  SC   2 unit





  .BEDTIME SLIDING SC PRN   Administration





  Bedtime Correctional Scale  


 


Metformin HCl  500 mg  02/01/21 08:00  02/02/21 16:10





  Metformin 500 Mg Tab  PO   500 mg





  [id number] JYOTI   Administration


 


Multivitamins  1 tab  01/31/21 09:00  02/02/21 08:00





  Multivit, Therapeutic 1 Tab  PO   1 tab





  DAILY JYOTI   Administration


 


Pantoprazole Sodium  40 mg  01/31/21 21:00  02/02/21 20:38





  Pantoprazole 40 Mg Tab  PO   40 mg





  HS JYOTI   Administration


 


Zinc Sulfate  220 mg  01/30/21 09:00  02/02/21 08:00





  Zinc Sulfate 220 Mg Cap  PO   220 mg





  DAILY JYOTI   Administration














Hospitalist Exam


Vitals: 


                             Vital Signs (12 hours)











  Temp Pulse Resp BP Pulse Ox


 


 02/02/21 21:26  99.4 F  85  20  135/70  97








                                     Weight











Weight                         209 lb 7.04 oz














General Appearance: NAD


Neck: supple, no JVD


Heart: RRR, no gallops


Respiratory: rales, rhonchi, tachypneic


Gastrointestinal: soft, non-distended, normal bowel sounds


Extremities: no cyanosis


Neurological: no new deficit


Musculoskeletal: generalized weakness


Psychiatric: A&O x 3





Hosp A/P


(1) Acute respiratory failure with hypoxia


Code(s): J96.01 - ACUTE RESPIRATORY FAILURE WITH HYPOXIA   Status: Acute   





(2) Pneumonia due to COVID-19 virus


Code(s): U07.1 - COVID-19; J12.82 - PNEUMONIA DUE TO CORONAVIRUS DISEASE 2019   

Status: Acute   





(3) Severe sepsis


Code(s): A41.9 - SEPSIS, UNSPECIFIED ORGANISM; R65.20 - SEVERE SEPSIS WITHOUT 

SEPTIC SHOCK   Status: Suspected   





(4) Diabetes mellitus, type II


Status: Chronic   


Qualifiers: 


   Chronic kidney disease stage: stage 2 (mild) 





(5) Obesity (BMI 30-39.9)


Code(s): E66.9 - OBESITY, UNSPECIFIED   Status: Chronic   





(6) Hypertension


Code(s): I10 - ESSENTIAL (PRIMARY) HYPERTENSION   Status: Chronic   





- Plan





Remains on 5 L nasal cannula.  Continue Remdesivirday 4.  Continue bro

nchodilators.  Continue Lovenox along with dexamethasone, metformin and other 

medications as above.  Continue sliding-scale.  Monitor inflammatory markers.





2/1


Continue O2 supplementation.  Recheck CRP and D-dimer in a.m.  Continue 

remdesivir with Decadron.  Continue bronchodilators.  Wean O2 as tolerated.  

Continue Metformin with sliding scale.





1/31


Patient is requiring 5 L O2 nasal cannula.  Infectious disease input 

appreciated.  Antibiotics discontinued.  Will monitor d-dimer and CRP every 

other day.  Continue supportive care.  Continue bronchodilators with Remdesivir 

and Decadron.    Continue COVID 19 isolation.  Plan discussed with the patient. 

Continue Lovenox for DVT prophylaxis.

## 2021-02-02 NOTE — PDOC.HOSPP
- Subjective


Encounter Date: 02/01/21


Encounter Time: 12:30


Subjective: 


Patient seen and examined for respiratory failure.  Remains on 5 L nasal 

cannula.  No significant change in shortness of breath.  Denies any other 

complaints.





- Objective


Vital Signs & Weight: 


                             Vital Signs (12 hours)











  Temp Pulse Resp BP Pulse Ox


 


 02/02/21 08:00  98.4 F  82  18  116/66  95








                                     Weight











Weight                         209 lb 7.04 oz














Result Diagrams: 


                                 02/02/21 05:26





                                 02/02/21 05:26


Additional Labs: 


                                   Accuchecks











  02/02/21 02/01/21 02/01/21





  05:30 20:19 16:23


 


POC Glucose  152 H  146 H  90














  02/01/21





  11:29


 


POC Glucose  115 H








                                        








Radiology Reviewed by me: Yes (Chest x-raypneumonia)





Hospitalist ROS





- Review of Systems


Cardiovascular: denies: chest pain, palpitations, orthopnea, paroxysmal noc. 

dyspnea, edema, light headedness, other


Gastrointestinal: denies: nausea, vomiting, abdominal pain, diarrhea, 

constipation, melena, hematochezia, other





- Medication


Medications: 


Active Medications











Generic Name Dose Route Start Last Admin





  Trade Name Freq  PRN Reason Stop Dose Admin


 


Acetaminophen  650 mg  01/30/21 02:34  02/01/21 20:26





  Acetaminophen 325 Mg Tab  PO   650 mg





  Q4H PRN   Administration





  Headache/Fever/Mild Pain (1-3)  


 


Albuterol Sulfate  2 puff  01/30/21 14:30  02/02/21 10:07





  Albuterol 200 Puff (6.7gm Inhaler)  INH   2 puff





  [id number] JYOTI   Administration


 


Ascorbic Acid  500 mg  01/30/21 09:00  02/02/21 08:00





  Ascorbic Acid 500 Mg Chewable Tablet  PO   500 mg





  DAILY JYOTI   Administration


 


Aspirin  81 mg  01/30/21 09:00  02/02/21 08:00





  Aspirin Chewable 81 Mg Tab  PO   81 mg





  DAILY JYOTI   Administration


 


Atorvastatin Calcium  10 mg  01/30/21 09:00  02/02/21 08:00





  Atorvastatin Calcium 10 Mg Tab  PO   10 mg





  DAILY JYOTI   Administration


 


Cholecalciferol  2,000 units  01/30/21 09:00  02/02/21 08:00





  Cholecalciferol 1,000 Units (25 Mcg) Tab  PO   2,000 units





  DAILY JYOTI   Administration


 


Dexamethasone  6 mg  01/30/21 21:00  02/01/21 20:27





  Dexamethasone 4 Mg/Ml Vial  SLOW IVP   6 mg





  HS JYOTI   Administration


 


Enoxaparin Sodium  40 mg  01/30/21 21:00  02/02/21 07:59





  Enoxaparin Sodium 40 Mg/0.4 Ml Syringe  SC   40 mg





  0900,2100 JYOTI   Administration


 


Guaifenesin/Dextromethorphan  15 ml  01/30/21 02:34  02/02/21 08:00





  Guaifenesin Dm 100-10/5 Ml Udcup  PO   15 ml





  Q4H PRN   Administration





  Cough  


 


Remdesivir 100 mg/ Sodium  250 mls @ 250 mls/hr  01/31/21 16:00  02/01/21 17:04





  Chloride  IV  02/03/21 16:59  250 mls





  1600 JYOTI   Administration


 


Insulin Human Lispro  0 units  01/30/21 02:48  02/02/21 06:00





  Humalog 300 Units/3 Ml Vial  SC   2 units





  .MILD SLIDING SCALE PRN   Administration





  Mild Correctional Scale  


 


Insulin Human Lispro  0 units  01/30/21 12:51  01/30/21 20:56





  Humalog 300 Units/3 Ml Vial  SC   2 unit





  .BEDTIME SLIDING SC PRN   Administration





  Bedtime Correctional Scale  


 


Metformin HCl  500 mg  02/01/21 08:00  02/02/21 08:00





  Metformin 500 Mg Tab  PO   500 mg





  BID-WM JYOTI   Administration


 


Multivitamins  1 tab  01/31/21 09:00  02/02/21 08:00





  Multivit, Therapeutic 1 Tab  PO   1 tab





  DAILY JYOTI   Administration


 


Pantoprazole Sodium  40 mg  01/31/21 21:00  02/01/21 20:26





  Pantoprazole 40 Mg Tab  PO   40 mg





  HS JYOTI   Administration


 


Zinc Sulfate  220 mg  01/30/21 09:00  02/02/21 08:00





  Zinc Sulfate 220 Mg Cap  PO   220 mg





  DAILY JYOTI   Administration














Hospitalist Exam


Vitals: 


                             Vital Signs (12 hours)











  Temp Pulse Resp BP Pulse Ox


 


 02/02/21 08:00  98.4 F  82  18  116/66  95








                                     Weight











Weight                         209 lb 7.04 oz














General Appearance: awake alert, ill appearing


Neck: supple, no JVD


Heart: no gallops, no rubs


Respiratory: rales, rhonchi


Gastrointestinal: soft, non-distended, normal bowel sounds


Extremities: no cyanosis


Neurological: no new deficit





Hosp A/P


(1) Acute respiratory failure with hypoxia


Code(s): J96.01 - ACUTE RESPIRATORY FAILURE WITH HYPOXIA   Status: Acute   





(2) Pneumonia due to COVID-19 virus


Code(s): U07.1 - COVID-19; J12.82 - PNEUMONIA DUE TO CORONAVIRUS DISEASE 2019   

Status: Acute   





(3) Severe sepsis


Code(s): A41.9 - SEPSIS, UNSPECIFIED ORGANISM; R65.20 - SEVERE SEPSIS WITHOUT 

SEPTIC SHOCK   Status: Suspected   





(4) Diabetes mellitus, type II


Status: Chronic   


Qualifiers: 


   Chronic kidney disease stage: stage 2 (mild) 





(5) Obesity (BMI 30-39.9)


Code(s): E66.9 - OBESITY, UNSPECIFIED   Status: Chronic   





(6) Hypertension


Code(s): I10 - ESSENTIAL (PRIMARY) HYPERTENSION   Status: Chronic   





- Plan


DVT proph w/lovenox, DVT proph w/SCDs





Continue O2 supplementation.  Recheck CRP and D-dimer in a.m.  Continue remdesi

vir with Decadron.  Continue bronchodilators.  Wean O2 as tolerated.  Continue 

Metformin with sliding scale.





1/31


Patient is requiring 5 L O2 nasal cannula.  Infectious disease input 

appreciated.  Antibiotics discontinued.  Will monitor d-dimer and CRP every 

other day.  Continue supportive care.  Continue bronchodilators with Remdesivir 

and Decadron.    Continue COVID 19 isolation.  Plan discussed with the patient. 

Continue Lovenox for DVT prophylaxis.

## 2021-02-03 LAB
ALBUMIN SERPL BCG-MCNC: 3.2 G/DL (ref 3.5–5)
ALP SERPL-CCNC: 90 U/L (ref 40–110)
ALT SERPL W P-5'-P-CCNC: 21 U/L (ref 8–55)
ANION GAP SERPL CALC-SCNC: 15 MMOL/L (ref 10–20)
AST SERPL-CCNC: 31 U/L (ref 5–34)
BILIRUB SERPL-MCNC: 0.8 MG/DL (ref 0.2–1.2)
BUN SERPL-MCNC: 16 MG/DL (ref 8.4–25.7)
CALCIUM SERPL-MCNC: 8.8 MG/DL (ref 7.8–10.44)
CHLORIDE SERPL-SCNC: 103 MMOL/L (ref 98–107)
CO2 SERPL-SCNC: 21 MMOL/L (ref 22–29)
CREAT CL PREDICTED SERPL C-G-VRATE: 134 ML/MIN (ref 70–130)
GLOBULIN SER CALC-MCNC: 3 G/DL (ref 2.4–3.5)
GLUCOSE SERPL-MCNC: 111 MG/DL (ref 70–105)
HGB BLD-MCNC: 12.1 G/DL (ref 14–18)
MCH RBC QN AUTO: 28.9 PG (ref 27–31)
MCV RBC AUTO: 88.4 FL (ref 78–98)
MDIFF COMPLETE?: YES
PLATELET # BLD AUTO: 339 THOU/UL (ref 130–400)
POTASSIUM SERPL-SCNC: 4.4 MMOL/L (ref 3.5–5.1)
RBC # BLD AUTO: 4.17 MILL/UL (ref 4.7–6.1)
SODIUM SERPL-SCNC: 135 MMOL/L (ref 136–145)
WBC # BLD AUTO: 13.9 THOU/UL (ref 4.8–10.8)

## 2021-02-03 RX ADMIN — Medication SCH MG: at 08:26

## 2021-02-03 RX ADMIN — GUAIFENESIN AND DEXTROMETHORPHAN PRN ML: 100; 10 SYRUP ORAL at 20:22

## 2021-02-03 RX ADMIN — THERA TABS SCH TAB: TAB at 08:26

## 2021-02-03 RX ADMIN — ALBUTEROL SULFATE SCH PUFF: 108 INHALANT RESPIRATORY (INHALATION) at 10:27

## 2021-02-03 RX ADMIN — REMDESIVIR SCH MLS: 100 INJECTION, POWDER, LYOPHILIZED, FOR SOLUTION INTRAVENOUS at 17:04

## 2021-02-03 RX ADMIN — ALBUTEROL SULFATE SCH PUFF: 108 INHALANT RESPIRATORY (INHALATION) at 15:37

## 2021-02-03 RX ADMIN — ALBUTEROL SULFATE SCH PUFF: 108 INHALANT RESPIRATORY (INHALATION) at 08:25

## 2021-02-03 RX ADMIN — ALBUTEROL SULFATE SCH PUFF: 108 INHALANT RESPIRATORY (INHALATION) at 22:30

## 2021-02-03 RX ADMIN — Medication SCH UNITS: at 08:26

## 2021-02-03 RX ADMIN — ASPIRIN 81 MG CHEWABLE TABLET SCH MG: 81 TABLET CHEWABLE at 08:26

## 2021-02-03 RX ADMIN — ALBUTEROL SULFATE SCH: 108 INHALANT RESPIRATORY (INHALATION) at 08:25

## 2021-02-03 RX ADMIN — GUAIFENESIN AND DEXTROMETHORPHAN PRN ML: 100; 10 SYRUP ORAL at 08:37

## 2021-02-03 RX ADMIN — ALBUTEROL SULFATE SCH PUFF: 108 INHALANT RESPIRATORY (INHALATION) at 18:04

## 2021-02-03 NOTE — PDOC.HOSPP
- Subjective


Encounter Date: 02/03/21


Encounter Time: 14:00


Subjective: 


Patient seen and examined for COVID 19 pneumonia/respiratory failure.  Denies 

any new complaints.  Mild dry cough.





- Objective


Vital Signs & Weight: 


                             Vital Signs (12 hours)











  Temp Pulse Resp BP BP Pulse Ox


 


 02/03/21 20:00  98.6 F  86  18  117/65   93 L


 


 02/03/21 18:56  99.3 F  97  20   125/77  90 L


 


 02/03/21 11:10  99.2 F  102 H  20   111/68  97








                                     Weight











Weight                         209 lb 7.04 oz














Result Diagrams: 


                                 02/03/21 05:48





                                 02/03/21 05:48


Additional Labs: 


                                   Accuchecks











  02/03/21 02/03/21 02/03/21





  17:06 11:10 05:11


 


POC Glucose  103 H  97  115 H














  02/02/21





  16:59


 


POC Glucose  125 H








                                        





                                        


Abnormal Lab Results - Last 48 hrs





02/02/21 05:26: C-Reactive Protein 12.85 H, Albumin 3.2 L, Albumin/Globulin 

Ratio 1.0 L


02/02/21 05:26: WBC 11.8 H, RBC 4.02 L, Hgb 12.0 L, Hct 36.0 L, Neutrophils % 

(Manual) 81 H, Band Neuts % (Manual) 2 L, Lymphocytes % (Manual) 9 L, Myelocytes

% 2 H


02/02/21 05:26: D-Dimer 0.80 H


02/03/21 05:48: Sodium 135 L, Carbon Dioxide 21 L, Albumin 3.2 L, 

Albumin/Globulin Ratio 1.1 L


02/03/21 05:48: WBC 13.9 H, RBC 4.17 L, Hgb 12.1 L, Hct 36.9 L, Neutrophils % 

(Manual) 79 H, Band Neuts % (Manual) 1 L, Lymphocytes % (Manual) 9 L











Hospitalist ROS





- Review of Systems


Cardiovascular: denies: chest pain, palpitations, orthopnea, paroxysmal noc. 

dyspnea, edema, light headedness, other


Gastrointestinal: denies: nausea, vomiting, abdominal pain, diarrhea, 

constipation, melena, hematochezia, other





- Medication


Medications: 


Active Medications











Generic Name Dose Route Start Last Admin





  Trade Name Freq  PRN Reason Stop Dose Admin


 


Acetaminophen  650 mg  01/30/21 02:34  02/02/21 20:40





  Acetaminophen 325 Mg Tab  PO   650 mg





  Q4H PRN   Administration





  Headache/Fever/Mild Pain (1-3)  


 


Albuterol Sulfate  2 puff  01/30/21 14:30  02/03/21 22:30





  Albuterol 200 Puff (6.7gm Inhaler)  INH   2 puff





  [id number] JYOTI   Administration


 


Ascorbic Acid  500 mg  01/30/21 09:00  02/03/21 08:26





  Ascorbic Acid 500 Mg Chewable Tablet  PO   500 mg





  DAILY JYOTI   Administration


 


Aspirin  81 mg  01/30/21 09:00  02/03/21 08:26





  Aspirin Chewable 81 Mg Tab  PO   81 mg





  DAILY JYOTI   Administration


 


Atorvastatin Calcium  10 mg  01/30/21 09:00  02/03/21 08:26





  Atorvastatin Calcium 10 Mg Tab  PO   10 mg





  DAILY JYOTI   Administration


 


Cholecalciferol  2,000 units  01/30/21 09:00  02/03/21 08:26





  Cholecalciferol 1,000 Units (25 Mcg) Tab  PO   2,000 units





  DAILY JYOTI   Administration


 


Dexamethasone  6 mg  01/30/21 21:00  02/03/21 20:23





  Dexamethasone 4 Mg/Ml Vial  SLOW IVP   6 mg





  HS JYOTI   Administration


 


Enoxaparin Sodium  40 mg  01/30/21 21:00  02/03/21 20:23





  Enoxaparin Sodium 40 Mg/0.4 Ml Syringe  SC   40 mg





  0900,2100 JYOTI   Administration


 


Guaifenesin/Dextromethorphan  15 ml  01/30/21 02:34  02/03/21 20:22





  Guaifenesin Dm 100-10/5 Ml Udcup  PO   15 ml





  Q4H PRN   Administration





  Cough  


 


Insulin Human Lispro  0 units  01/30/21 02:48  02/02/21 06:00





  Humalog 300 Units/3 Ml Vial  SC   2 units





  .MILD SLIDING SCALE PRN   Administration





  Mild Correctional Scale  


 


Insulin Human Lispro  0 units  01/30/21 12:51  01/30/21 20:56





  Humalog 300 Units/3 Ml Vial  SC   2 unit





  .BEDTIME SLIDING SC PRN   Administration





  Bedtime Correctional Scale  


 


Metformin HCl  500 mg  02/01/21 08:00  02/03/21 17:05





  Metformin 500 Mg Tab  PO   500 mg





  BID-WM JYOTI   Administration


 


Multivitamins  1 tab  01/31/21 09:00  02/03/21 08:26





  Multivit, Therapeutic 1 Tab  PO   1 tab





  DAILY JYOTI   Administration


 


Pantoprazole Sodium  40 mg  01/31/21 21:00  02/03/21 20:22





  Pantoprazole 40 Mg Tab  PO   40 mg





  HS JYOTI   Administration


 


Zinc Sulfate  220 mg  01/30/21 09:00  02/03/21 08:25





  Zinc Sulfate 220 Mg Cap  PO   220 mg





  DAILY JYOTI   Administration














Hospitalist Exam


Vitals: 


                             Vital Signs (12 hours)











  Temp Pulse Resp BP BP Pulse Ox


 


 02/03/21 20:00  98.6 F  86  18  117/65   93 L


 


 02/03/21 18:56  99.3 F  97  20   125/77  90 L


 


 02/03/21 11:10  99.2 F  102 H  20   111/68  97








                                     Weight











Weight                         209 lb 7.04 oz














General Appearance: NAD


Neck: supple, no JVD


Heart: RRR, no gallops


Respiratory: no wheezes, rales, rhonchi


Gastrointestinal: soft, normal bowel sounds, no guarding, no rigidity


Extremities: no cyanosis


Musculoskeletal: generalized weakness





Hosp A/P


(1) Acute respiratory failure with hypoxia


Code(s): J96.01 - ACUTE RESPIRATORY FAILURE WITH HYPOXIA   Status: Acute   





(2) Pneumonia due to COVID-19 virus


Code(s): U07.1 - COVID-19; J12.82 - PNEUMONIA DUE TO CORONAVIRUS DISEASE 2019   

Status: Acute   





(3) Severe sepsis


Code(s): A41.9 - SEPSIS, UNSPECIFIED ORGANISM; R65.20 - SEVERE SEPSIS WITHOUT 

SEPTIC SHOCK   Status: Suspected   





(4) Diabetes mellitus, type II


Status: Chronic   


Qualifiers: 


   Chronic kidney disease stage: stage 2 (mild) 





(5) Obesity (BMI 30-39.9)


Code(s): E66.9 - OBESITY, UNSPECIFIED   Status: Chronic   





(6) Hypertension


Code(s): I10 - ESSENTIAL (PRIMARY) HYPERTENSION   Status: Chronic   





- Plan


DVT proph w/lovenox, DVT proph w/SCDs





Wean O2 as tolerated.  Check inflammatory markers in a.m.  Last dose of 

Remdesivir today.  Continue bronchodilators, metformin, sliding scale as well as

Lovenox.  Continue PPIs and other medications as above.  DC planning once on 2 L

oxygen nasal cannula.





2/2


Remains on 5 L nasal cannula.  Continue Remdesivirday 4.  Continue 

bronchodilators.  Continue Lovenox along with dexamethasone, metformin and other

medications as above.  Continue sliding-scale.  Monitor inflammatory markers.





2/1


Continue O2 supplementation.  Recheck CRP and D-dimer in a.m.  Continue 

remdesivir with Decadron.  Continue bronchodilators.  Wean O2 as tolerated.  

Continue Metformin with sliding scale.





1/31


Patient is requiring 5 L O2 nasal cannula.  Infectious disease input 

appreciated.  Antibiotics discontinued.  Will monitor d-dimer and CRP every 

other day.  Continue supportive care.  Continue bronchodilators with Remdesivir 

and Decadron.    Continue COVID 19 isolation.  Plan discussed with the patient. 

Continue Lovenox for DVT prophylaxis.

## 2021-02-04 LAB
ALBUMIN SERPL BCG-MCNC: 3.2 G/DL (ref 3.5–5)
ALP SERPL-CCNC: 90 U/L (ref 40–110)
ALT SERPL W P-5'-P-CCNC: 17 U/L (ref 8–55)
ANION GAP SERPL CALC-SCNC: 14 MMOL/L (ref 10–20)
AST SERPL-CCNC: 20 U/L (ref 5–34)
BASOPHILS # BLD AUTO: 0.1 THOU/UL (ref 0–0.2)
BASOPHILS NFR BLD AUTO: 0.7 % (ref 0–1)
BILIRUB SERPL-MCNC: 0.8 MG/DL (ref 0.2–1.2)
BUN SERPL-MCNC: 21 MG/DL (ref 8.4–25.7)
CALCIUM SERPL-MCNC: 9 MG/DL (ref 7.8–10.44)
CHLORIDE SERPL-SCNC: 101 MMOL/L (ref 98–107)
CO2 SERPL-SCNC: 24 MMOL/L (ref 22–29)
CREAT CL PREDICTED SERPL C-G-VRATE: 110 ML/MIN (ref 70–130)
EOSINOPHIL # BLD AUTO: 0.1 THOU/UL (ref 0–0.7)
EOSINOPHIL NFR BLD AUTO: 0.9 % (ref 0–10)
GLOBULIN SER CALC-MCNC: 3.3 G/DL (ref 2.4–3.5)
GLUCOSE SERPL-MCNC: 180 MG/DL (ref 70–105)
HGB BLD-MCNC: 12.4 G/DL (ref 14–18)
LYMPHOCYTES # BLD: 0.5 THOU/UL (ref 1.2–3.4)
LYMPHOCYTES NFR BLD AUTO: 5.1 % (ref 21–51)
MCH RBC QN AUTO: 29.7 PG (ref 27–31)
MCV RBC AUTO: 89.9 FL (ref 78–98)
MONOCYTES # BLD AUTO: 0.3 THOU/UL (ref 0.11–0.59)
MONOCYTES NFR BLD AUTO: 2.6 % (ref 0–10)
NEUTROPHILS # BLD AUTO: 9.2 THOU/UL (ref 1.4–6.5)
NEUTROPHILS NFR BLD AUTO: 90.5 % (ref 42–75)
PLATELET # BLD AUTO: 392 THOU/UL (ref 130–400)
POTASSIUM SERPL-SCNC: 5 MMOL/L (ref 3.5–5.1)
RBC # BLD AUTO: 4.19 MILL/UL (ref 4.7–6.1)
SODIUM SERPL-SCNC: 134 MMOL/L (ref 136–145)
WBC # BLD AUTO: 10.2 THOU/UL (ref 4.8–10.8)

## 2021-02-04 RX ADMIN — THERA TABS SCH TAB: TAB at 08:31

## 2021-02-04 RX ADMIN — ALBUTEROL SULFATE SCH PUFF: 108 INHALANT RESPIRATORY (INHALATION) at 18:12

## 2021-02-04 RX ADMIN — INSULIN LISPRO PRN UNITS: 100 INJECTION, SOLUTION INTRAVENOUS; SUBCUTANEOUS at 05:31

## 2021-02-04 RX ADMIN — ALBUTEROL SULFATE SCH PUFF: 108 INHALANT RESPIRATORY (INHALATION) at 14:15

## 2021-02-04 RX ADMIN — ALBUTEROL SULFATE SCH PUFF: 108 INHALANT RESPIRATORY (INHALATION) at 05:30

## 2021-02-04 RX ADMIN — ALBUTEROL SULFATE SCH PUFF: 108 INHALANT RESPIRATORY (INHALATION) at 03:10

## 2021-02-04 RX ADMIN — Medication SCH UNITS: at 08:31

## 2021-02-04 RX ADMIN — GUAIFENESIN AND DEXTROMETHORPHAN PRN ML: 100; 10 SYRUP ORAL at 11:11

## 2021-02-04 RX ADMIN — GUAIFENESIN AND DEXTROMETHORPHAN PRN ML: 100; 10 SYRUP ORAL at 18:17

## 2021-02-04 RX ADMIN — ALBUTEROL SULFATE SCH PUFF: 108 INHALANT RESPIRATORY (INHALATION) at 11:09

## 2021-02-04 RX ADMIN — ALBUTEROL SULFATE SCH PUFF: 108 INHALANT RESPIRATORY (INHALATION) at 23:24

## 2021-02-04 RX ADMIN — ASPIRIN 81 MG CHEWABLE TABLET SCH MG: 81 TABLET CHEWABLE at 08:31

## 2021-02-04 RX ADMIN — INSULIN LISPRO PRN UNITS: 100 INJECTION, SOLUTION INTRAVENOUS; SUBCUTANEOUS at 11:51

## 2021-02-04 RX ADMIN — Medication SCH MG: at 08:31

## 2021-02-04 NOTE — PRG
DATE OF SERVICE:  02/04/2021



SUBJECTIVE:  Mr. Aragon has experienced a decrease in requirements for O2

supplementation and was counting on being discharged to maybe tomorrow.  He 
still

desaturates quite a bit on effort.  When I tried him, he went up to 95 on when 
all

the way to 83 after a brief effort.  It slowly went back to 90 after a while.  
This

is on 3.5 L nasal cannula O2 supplementation. 



OBJECTIVE:  LUNGS:  With still scattered inspiratory crackles, but not as 
prominent

as before.  No wheezing. 

HEART:  S1, S2.  Regular rate. 

ABDOMEN:  Soft, not distended or tender.  No ascites.  No bladder distention. 

EXTREMITIES:  Moves all extremities equally.



LABORATORY DATA:  White cell count is down to 10.2, hemoglobin 12.4, platelets 
392.

D-dimer is down to 0.71, but the C-reactive protein is up for some reason from 8
to

15.  Ferritin is down from 900 to 489.  I have not seen x-ray done since he was

admitted. 



ASSESSMENT AND DISCUSSION:  

1. Type 2 diabetes.

2. Hypertension.

3. Obesity.

4. Moderate-to-severe SARs-CoV-2 pneumonia. 

He finished remdesivir and is still on Decadron.  He seems to be improving.  
This is

the 15th day of illness.   increase in CRP, but overall his O2

requirements are down.  Still quite marginal oxygenation after exercise, so I do
not

think he is ready for discharge planning just yet and we need to see the CRP

trending down before he goes anywhere. 







Job ID:  [id number]



MTDD

## 2021-02-04 NOTE — PDOC.HOSPP
- Subjective


Encounter Date: 02/04/21


Encounter Time: 17:00


Subjective: 


Patient evaluated for COVID 19 pneumonia.  Shortness of breath slightly better. 

Denies any new complaints.





- Objective


Vital Signs & Weight: 


                             Vital Signs (12 hours)











  Temp Pulse Resp BP Pulse Ox


 


 02/04/21 20:00  98.3 F  84  18  117/68  91 L


 


 02/04/21 16:25  98.2 F  77  18  108/64  94 L








                                     Weight











Weight                         209 lb 7.04 oz














Result Diagrams: 


                                 02/04/21 06:30





                                 02/04/21 06:30


Additional Labs: 


                                   Accuchecks











  02/04/21 02/04/21 02/04/21





  20:06 11:28 04:20


 


POC Glucose  147 H  169 H  175 H














  02/03/21





  20:50


 


POC Glucose  169 H








                                        


Abnormal Lab Results - Last 48 hrs





02/03/21 05:48: Sodium 135 L, Carbon Dioxide 21 L, Albumin 3.2 L, 

Albumin/Globulin Ratio 1.1 L


02/03/21 05:48: WBC 13.9 H, RBC 4.17 L, Hgb 12.1 L, Hct 36.9 L, Neutrophils % 

(Manual) 79 H, Band Neuts % (Manual) 1 L, Lymphocytes % (Manual) 9 L


02/04/21 06:30: Sodium 134 L, Albumin 3.2 L, Albumin/Globulin Ratio 1.0 L


02/04/21 06:30: RBC 4.19 L, Hgb 12.4 L, Hct 37.6 L, Neutrophils % 90.5 H, 

Lymphocytes % 5.1 L, Neutrophils # 9.2 H, Lymphocytes # 0.5 L


02/04/21 06:30: C-Reactive Protein 15.45 H


02/04/21 06:30: Ferritin 489.37 H


02/04/21 06:30: D-Dimer 0.71 H











Hospitalist ROS





- Review of Systems


Cardiovascular: denies: chest pain, palpitations, orthopnea, paroxysmal noc. 

dyspnea, edema, light headedness, other


Gastrointestinal: denies: nausea, vomiting, abdominal pain, diarrhea, 

constipation, melena, hematochezia, other





- Medication


Medications: 


Active Medications











Generic Name Dose Route Start Last Admin





  Trade Name Freq  PRN Reason Stop Dose Admin


 


Acetaminophen  650 mg  01/30/21 02:34  02/02/21 20:40





  Acetaminophen 325 Mg Tab  PO   650 mg





  Q4H PRN   Administration





  Headache/Fever/Mild Pain (1-3)  


 


Albuterol Sulfate  2 puff  01/30/21 14:30  02/04/21 23:24





  Albuterol 200 Puff (6.7gm Inhaler)  INH   2 puff





  [id number] JYOTI   Administration


 


Ascorbic Acid  500 mg  01/30/21 09:00  02/04/21 08:31





  Ascorbic Acid 500 Mg Chewable Tablet  PO   500 mg





  DAILY JYOTI   Administration


 


Aspirin  81 mg  01/30/21 09:00  02/04/21 08:31





  Aspirin Chewable 81 Mg Tab  PO   81 mg





  DAILY JYOTI   Administration


 


Atorvastatin Calcium  10 mg  01/30/21 09:00  02/04/21 08:31





  Atorvastatin Calcium 10 Mg Tab  PO   10 mg





  DAILY JYOTI   Administration


 


Cholecalciferol  2,000 units  01/30/21 09:00  02/04/21 08:31





  Cholecalciferol 1,000 Units (25 Mcg) Tab  PO   2,000 units





  DAILY JYOTI   Administration


 


Dexamethasone  6 mg  01/30/21 21:00  02/04/21 21:24





  Dexamethasone 4 Mg/Ml Vial  SLOW IVP   6 mg





  HS JYOTI   Administration


 


Enoxaparin Sodium  40 mg  01/30/21 21:00  02/04/21 21:24





  Enoxaparin Sodium 40 Mg/0.4 Ml Syringe  SC   40 mg





  0900,2100 JYOTI   Administration


 


Guaifenesin/Dextromethorphan  15 ml  01/30/21 02:34  02/04/21 18:17





  Guaifenesin Dm 100-10/5 Ml Udcup  PO   15 ml





  Q4H PRN   Administration





  Cough  


 


Insulin Human Lispro  0 units  01/30/21 02:48  02/04/21 11:51





  Humalog 300 Units/3 Ml Vial  SC   2 units





  .MILD SLIDING SCALE PRN   Administration





  Mild Correctional Scale  


 


Insulin Human Lispro  0 units  01/30/21 12:51  01/30/21 20:56





  Humalog 300 Units/3 Ml Vial  SC   2 unit





  .BEDTIME SLIDING SC PRN   Administration





  Bedtime Correctional Scale  


 


Loratadine  10 mg  02/04/21 21:00  02/04/21 21:25





  Loratadine 10 Mg Tab  PO   10 mg





  HS JYOTI   Administration


 


Metformin HCl  500 mg  02/01/21 08:00  02/04/21 16:37





  Metformin 500 Mg Tab  PO   500 mg





  BID-WM JYOTI   Administration


 


Multivitamins  1 tab  01/31/21 09:00  02/04/21 08:31





  Multivit, Therapeutic 1 Tab  PO   1 tab





  DAILY JYOTI   Administration


 


Pantoprazole Sodium  40 mg  01/31/21 21:00  02/04/21 21:25





  Pantoprazole 40 Mg Tab  PO   40 mg





  HS JYOTI   Administration


 


Zinc Sulfate  220 mg  01/30/21 09:00  02/04/21 08:31





  Zinc Sulfate 220 Mg Cap  PO   220 mg





  DAILY JYOTI   Administration














Hospitalist Exam


Vitals: 


                             Vital Signs (12 hours)











  Temp Pulse Resp BP Pulse Ox


 


 02/04/21 20:00  98.3 F  84  18  117/68  91 L


 


 02/04/21 16:25  98.2 F  77  18  108/64  94 L








                                     Weight











Weight                         209 lb 7.04 oz














General Appearance: awake alert


Respiratory: tachypneic


Musculoskeletal: generalized weakness


Psychiatric: normal affect, A&O x 3





Hosp A/P


(1) Acute respiratory failure with hypoxia


Code(s): J96.01 - ACUTE RESPIRATORY FAILURE WITH HYPOXIA   Status: Acute   





(2) Pneumonia due to COVID-19 virus


Code(s): U07.1 - COVID-19; J12.82 - PNEUMONIA DUE TO CORONAVIRUS DISEASE 2019   

Status: Acute   





(3) Severe sepsis


Code(s): A41.9 - SEPSIS, UNSPECIFIED ORGANISM; R65.20 - SEVERE SEPSIS WITHOUT 

SEPTIC SHOCK   Status: Suspected   





(4) Diabetes mellitus, type II


Status: Chronic   


Qualifiers: 


   Chronic kidney disease stage: stage 2 (mild) 





(5) Obesity (BMI 30-39.9)


Code(s): E66.9 - OBESITY, UNSPECIFIED   Status: Chronic   





(6) Hypertension


Code(s): I10 - ESSENTIAL (PRIMARY) HYPERTENSION   Status: Chronic   





- Plan





O2 requirement slowly improving.  Patient now on 3.5 L nasal cannula.  With mild

exertion O2 saturation dropped to 83 percent per ID.  Monitor inflammatory 

markers.  Continue sliding scale.  Completed Remdesivir.  Continue 

dexamethasone.  Continue Lovenox for DVT prophylaxis.  Add doxycycline and 

Dulera.  AM labs.





2/3


Wean O2 as tolerated.  Check inflammatory markers in a.m.  Last dose of 

Remdesivir today.  Continue bronchodilators, metformin, sliding scale as well as

Lovenox.  Continue PPIs and other medications as above.  DC planning once on 2 L

oxygen nasal cannula.





2/2


Remains on 5 L nasal cannula.  Continue Remdesivirday 4.  Continue 

bronchodilators.  Continue Lovenox along with dexamethasone, metformin and other

medications as above.  Continue sliding-scale.  Monitor inflammatory markers.





2/1


Continue O2 supplementation.  Recheck CRP and D-dimer in a.m.  Continue remde

sivir with Decadron.  Continue bronchodilators.  Wean O2 as tolerated.  Continue

Metformin with sliding scale.





1/31


Patient is requiring 5 L O2 nasal cannula.  Infectious disease input 

appreciated.  Antibiotics discontinued.  Will monitor d-dimer and CRP every 

other day.  Continue supportive care.  Continue bronchodilators with Remdesivir 

and Decadron.    Continue COVID 19 isolation.  Plan discussed with the patient. 

Continue Lovenox for DVT prophylaxis.

## 2021-02-05 LAB
ANION GAP SERPL CALC-SCNC: 14 MMOL/L (ref 10–20)
ANION GAP SERPL CALC-SCNC: 16 MMOL/L (ref 10–20)
BUN SERPL-MCNC: 22 MG/DL (ref 8.4–25.7)
BUN SERPL-MCNC: 23 MG/DL (ref 8.4–25.7)
CALCIUM SERPL-MCNC: 8.9 MG/DL (ref 7.8–10.44)
CALCIUM SERPL-MCNC: 9 MG/DL (ref 7.8–10.44)
CHLORIDE SERPL-SCNC: 101 MMOL/L (ref 98–107)
CHLORIDE SERPL-SCNC: 102 MMOL/L (ref 98–107)
CO2 SERPL-SCNC: 23 MMOL/L (ref 22–29)
CO2 SERPL-SCNC: 25 MMOL/L (ref 22–29)
CREAT CL PREDICTED SERPL C-G-VRATE: 106 ML/MIN (ref 70–130)
CREAT CL PREDICTED SERPL C-G-VRATE: 99 ML/MIN (ref 70–130)
GLUCOSE SERPL-MCNC: 175 MG/DL (ref 70–105)
GLUCOSE SERPL-MCNC: 196 MG/DL (ref 70–105)
HGB BLD-MCNC: 12.7 G/DL (ref 14–18)
MCH RBC QN AUTO: 29.1 PG (ref 27–31)
MCV RBC AUTO: 89 FL (ref 78–98)
MDIFF COMPLETE?: YES
PLATELET # BLD AUTO: 462 THOU/UL (ref 130–400)
POTASSIUM SERPL-SCNC: 4.7 MMOL/L (ref 3.5–5.1)
POTASSIUM SERPL-SCNC: 5.6 MMOL/L (ref 3.5–5.1)
RBC # BLD AUTO: 4.38 MILL/UL (ref 4.7–6.1)
SODIUM SERPL-SCNC: 135 MMOL/L (ref 136–145)
SODIUM SERPL-SCNC: 135 MMOL/L (ref 136–145)
WBC # BLD AUTO: 15.7 THOU/UL (ref 4.8–10.8)

## 2021-02-05 RX ADMIN — MOMETASONE FUROATE AND FORMOTEROL FUMARATE DIHYDRATE SCH PUFF: 200; 5 AEROSOL RESPIRATORY (INHALATION) at 18:01

## 2021-02-05 RX ADMIN — INSULIN LISPRO PRN UNITS: 100 INJECTION, SOLUTION INTRAVENOUS; SUBCUTANEOUS at 12:14

## 2021-02-05 RX ADMIN — ALBUTEROL SULFATE SCH PUFF: 108 INHALANT RESPIRATORY (INHALATION) at 18:01

## 2021-02-05 RX ADMIN — ALBUTEROL SULFATE SCH PUFF: 108 INHALANT RESPIRATORY (INHALATION) at 05:20

## 2021-02-05 RX ADMIN — ALBUTEROL SULFATE SCH PUFF: 108 INHALANT RESPIRATORY (INHALATION) at 23:24

## 2021-02-05 RX ADMIN — MOMETASONE FUROATE AND FORMOTEROL FUMARATE DIHYDRATE SCH PUFF: 200; 5 AEROSOL RESPIRATORY (INHALATION) at 05:21

## 2021-02-05 RX ADMIN — THERA TABS SCH TAB: TAB at 08:42

## 2021-02-05 RX ADMIN — GUAIFENESIN AND DEXTROMETHORPHAN PRN ML: 100; 10 SYRUP ORAL at 21:11

## 2021-02-05 RX ADMIN — GUAIFENESIN AND DEXTROMETHORPHAN PRN ML: 100; 10 SYRUP ORAL at 15:17

## 2021-02-05 RX ADMIN — ALBUTEROL SULFATE SCH PUFF: 108 INHALANT RESPIRATORY (INHALATION) at 11:03

## 2021-02-05 RX ADMIN — ALBUTEROL SULFATE SCH PUFF: 108 INHALANT RESPIRATORY (INHALATION) at 15:17

## 2021-02-05 RX ADMIN — GUAIFENESIN AND DEXTROMETHORPHAN PRN ML: 100; 10 SYRUP ORAL at 08:42

## 2021-02-05 RX ADMIN — Medication SCH UNITS: at 08:42

## 2021-02-05 RX ADMIN — Medication SCH MG: at 08:42

## 2021-02-05 RX ADMIN — ASPIRIN 81 MG CHEWABLE TABLET SCH MG: 81 TABLET CHEWABLE at 08:42

## 2021-02-05 RX ADMIN — ALBUTEROL SULFATE SCH PUFF: 108 INHALANT RESPIRATORY (INHALATION) at 03:09

## 2021-02-05 RX ADMIN — GUAIFENESIN AND DEXTROMETHORPHAN PRN ML: 100; 10 SYRUP ORAL at 00:03

## 2021-02-05 NOTE — PDOC.HOSPP
- Subjective


Encounter Date: 02/05/21


Encounter Time: 11:00


Subjective: 


Patient seen and examined for respiratory failure/COVID-19 pneumonia.  

Symptomatically improving.  Mild dry cough.  Short of breath on exertion.





- Objective


Vital Signs & Weight: 


                             Vital Signs (12 hours)











  Temp Pulse Resp BP Pulse Ox


 


 02/05/21 21:32      93 L


 


 02/05/21 20:00  98.5 F  88  18  96/54 L  93 L


 


 02/05/21 16:40  98.6 F  77  18  125/73  92 L


 


 02/05/21 11:43  98.2 F  92  18  124/75  90 L








                                     Weight











Weight                         209 lb 7.04 oz














I&O: 


                                        











 02/04/21 02/05/21 02/06/21





 06:59 06:59 06:59


 


Intake Total  570 


 


Output Total  2850 


 


Balance  -2280 











Result Diagrams: 


                                 02/06/21 05:53





                                 02/06/21 05:53


Additional Labs: 


                                   Accuchecks











  02/05/21 02/05/21 02/05/21





  20:35 16:42 05:13


 


POC Glucose  135 H  104 H  157 H














  02/04/21





  16:27


 


POC Glucose  146 H








                                        





Abnormal Lab Results - Last 48 hrs





02/05/21 05:17: Sodium 135 L, Potassium 5.6 H


02/05/21 05:17: WBC 15.7 H, RBC 4.38 L, Hgb 12.7 L, Hct 38.9 L, Plt Count 462 H,

Neutrophils % (Manual) 90 H, Lymphocytes % (Manual) 1 L, Plt Morphology Comment 

Appears Increased H


02/05/21 09:31: Sodium 135 L














Hospitalist ROS





- Review of Systems


Cardiovascular: denies: chest pain, palpitations, orthopnea, paroxysmal noc. 

dyspnea, edema, light headedness, other


Gastrointestinal: denies: nausea, vomiting, abdominal pain, diarrhea, 

constipation, melena, hematochezia, other





- Medication


Medications: 


Active Medications











Generic Name Dose Route Start Last Admin





  Trade Name Freq  PRN Reason Stop Dose Admin


 


Acetaminophen  650 mg  01/30/21 02:34  02/05/21 00:03





  Acetaminophen 325 Mg Tab  PO   650 mg





  Q4H PRN   Administration





  Headache/Fever/Mild Pain (1-3)  


 


Albuterol Sulfate  2 puff  01/30/21 14:30  02/05/21 18:01





  Albuterol 200 Puff (6.7gm Inhaler)  INH   2 puff





  [id number] JYOTI   Administration


 


Ascorbic Acid  500 mg  01/30/21 09:00  02/05/21 08:42





  Ascorbic Acid 500 Mg Chewable Tablet  PO   500 mg





  DAILY JYOTI   Administration


 


Aspirin  81 mg  01/30/21 09:00  02/05/21 08:42





  Aspirin Chewable 81 Mg Tab  PO   81 mg





  DAILY JYOTI   Administration


 


Atorvastatin Calcium  10 mg  01/30/21 09:00  02/05/21 08:42





  Atorvastatin Calcium 10 Mg Tab  PO   10 mg





  DAILY JYOTI   Administration


 


Cholecalciferol  2,000 units  01/30/21 09:00  02/05/21 08:42





  Cholecalciferol 1,000 Units (25 Mcg) Tab  PO   2,000 units





  DAILY JYOTI   Administration


 


Dexamethasone  6 mg  01/30/21 21:00  02/05/21 20:38





  Dexamethasone 4 Mg/Ml Vial  SLOW IVP   6 mg





  HS JYOTI   Administration


 


Doxycycline Hyclate  100 mg  02/05/21 09:00  02/05/21 20:38





  Doxycycline 100 Mg Cap  PO   100 mg





  BID JYOTI   Administration


 


Enoxaparin Sodium  40 mg  01/30/21 21:00  02/05/21 20:38





  Enoxaparin Sodium 40 Mg/0.4 Ml Syringe  SC   40 mg





  0900,2100 JYOTI   Administration


 


Guaifenesin/Dextromethorphan  15 ml  01/30/21 02:34  02/05/21 21:11





  Guaifenesin Dm 100-10/5 Ml Udcup  PO   15 ml





  Q4H PRN   Administration





  Cough  


 


Insulin Human Lispro  0 units  01/30/21 02:48  02/05/21 12:14





  Humalog 300 Units/3 Ml Vial  SC   2 units





  .MILD SLIDING SCALE PRN   Administration





  Mild Correctional Scale  


 


Insulin Human Lispro  0 units  01/30/21 12:51  01/30/21 20:56





  Humalog 300 Units/3 Ml Vial  SC   2 unit





  .BEDTIME SLIDING SC PRN   Administration





  Bedtime Correctional Scale  


 


Loratadine  10 mg  02/04/21 21:00  02/05/21 20:39





  Loratadine 10 Mg Tab  PO   10 mg





  HS JYOTI   Administration


 


Metformin HCl  500 mg  02/01/21 08:00  02/05/21 16:54





  Metformin 500 Mg Tab  PO   500 mg





  BID-WM JYOTI   Administration


 


Mometasone Furoate/Formoterol Fumar  2 puff  02/05/21 06:30  02/05/21 18:01





  Mometasone 200 Mcg/Formoterol 5 Mcg 120 Puff Inhaler  INH   2 puff





  BID-RT JYOTI   Administration


 


Multivitamins  1 tab  01/31/21 09:00  02/05/21 08:42





  Multivit, Therapeutic 1 Tab  PO   1 tab





  DAILY JYOTI   Administration


 


Pantoprazole Sodium  40 mg  01/31/21 21:00  02/05/21 20:38





  Pantoprazole 40 Mg Tab  PO   40 mg





  HS JYOTI   Administration


 


Zinc Sulfate  220 mg  01/30/21 09:00  02/05/21 08:42





  Zinc Sulfate 220 Mg Cap  PO   220 mg





  DAILY JYOTI   Administration














Hospitalist Exam


Vitals: 


                             Vital Signs (12 hours)











  Temp Pulse Resp BP Pulse Ox


 


 02/05/21 21:32      93 L


 


 02/05/21 20:00  98.5 F  88  18  96/54 L  93 L


 


 02/05/21 16:40  98.6 F  77  18  125/73  92 L


 


 02/05/21 11:43  98.2 F  92  18  124/75  90 L








                                     Weight











Weight                         209 lb 7.04 oz














General Appearance: awake alert


Neck: supple, no JVD


Heart: RRR, no gallops


Respiratory: no wheezes, normal chest expansion, no tachypnea (At rest), rales, 

rhonchi


Gastrointestinal: soft, no guarding, no rigidity


Extremities: no cyanosis


Neurological: no new deficit


Psychiatric: A&O x 3





Hosp A/P


(1) Acute respiratory failure with hypoxia


Code(s): J96.01 - ACUTE RESPIRATORY FAILURE WITH HYPOXIA   Status: Acute   





(2) Pneumonia due to COVID-19 virus


Code(s): U07.1 - COVID-19; J12.82 - PNEUMONIA DUE TO CORONAVIRUS DISEASE 2019   

Status: Acute   





(3) Severe sepsis


Code(s): A41.9 - SEPSIS, UNSPECIFIED ORGANISM; R65.20 - SEVERE SEPSIS WITHOUT 

SEPTIC SHOCK   Status: Suspected   





(4) Diabetes mellitus, type II


Status: Chronic   


Qualifiers: 


   Chronic kidney disease stage: stage 2 (mild) 





(5) Obesity (BMI 30-39.9)


Code(s): E66.9 - OBESITY, UNSPECIFIED   Status: Chronic   





(6) Hypertension


Code(s): I10 - ESSENTIAL (PRIMARY) HYPERTENSION   Status: Chronic   





- Plan





Wean O2 as tolerated.  Continue dexamethasone.  Patient completed remdesivir.  

Continue Dulera and doxycycline.  Continue bronchodilators.  Continue Metformin 

with sliding scale.  Continue DVT and GI prophylaxis.  DC planning in 2 to 3 

days if stable.  Home O2 set up at discharge.

## 2021-02-06 LAB
ALBUMIN SERPL BCG-MCNC: 3.2 G/DL (ref 3.5–5)
ALP SERPL-CCNC: 91 U/L (ref 40–110)
ALT SERPL W P-5'-P-CCNC: 15 U/L (ref 8–55)
ANION GAP SERPL CALC-SCNC: 13 MMOL/L (ref 10–20)
AST SERPL-CCNC: 17 U/L (ref 5–34)
BASOPHILS # BLD AUTO: 0 THOU/UL (ref 0–0.2)
BASOPHILS NFR BLD AUTO: 0.2 % (ref 0–1)
BILIRUB SERPL-MCNC: 0.7 MG/DL (ref 0.2–1.2)
BUN SERPL-MCNC: 19 MG/DL (ref 8.4–25.7)
CALCIUM SERPL-MCNC: 8.9 MG/DL (ref 7.8–10.44)
CHLORIDE SERPL-SCNC: 101 MMOL/L (ref 98–107)
CO2 SERPL-SCNC: 24 MMOL/L (ref 22–29)
CREAT CL PREDICTED SERPL C-G-VRATE: 120 ML/MIN (ref 70–130)
CRP SERPL-MCNC: 5.86 MG/DL
EOSINOPHIL # BLD AUTO: 0 THOU/UL (ref 0–0.7)
EOSINOPHIL NFR BLD AUTO: 0.1 % (ref 0–10)
GLOBULIN SER CALC-MCNC: 3.2 G/DL (ref 2.4–3.5)
GLUCOSE SERPL-MCNC: 155 MG/DL (ref 70–105)
HGB BLD-MCNC: 12.5 G/DL (ref 14–18)
LYMPHOCYTES # BLD: 0.5 THOU/UL (ref 1.2–3.4)
LYMPHOCYTES NFR BLD AUTO: 3.9 % (ref 21–51)
MCH RBC QN AUTO: 29.1 PG (ref 27–31)
MCV RBC AUTO: 88.7 FL (ref 78–98)
MONOCYTES # BLD AUTO: 0.5 THOU/UL (ref 0.11–0.59)
MONOCYTES NFR BLD AUTO: 3.7 % (ref 0–10)
NEUTROPHILS # BLD AUTO: 11.8 THOU/UL (ref 1.4–6.5)
NEUTROPHILS NFR BLD AUTO: 92.1 % (ref 42–75)
PLATELET # BLD AUTO: 452 THOU/UL (ref 130–400)
POTASSIUM SERPL-SCNC: 5.1 MMOL/L (ref 3.5–5.1)
RBC # BLD AUTO: 4.29 MILL/UL (ref 4.7–6.1)
SODIUM SERPL-SCNC: 133 MMOL/L (ref 136–145)
WBC # BLD AUTO: 12.9 THOU/UL (ref 4.8–10.8)

## 2021-02-06 RX ADMIN — GUAIFENESIN AND DEXTROMETHORPHAN PRN ML: 100; 10 SYRUP ORAL at 18:22

## 2021-02-06 RX ADMIN — ALBUTEROL SULFATE SCH PUFF: 108 INHALANT RESPIRATORY (INHALATION) at 13:42

## 2021-02-06 RX ADMIN — ALBUTEROL SULFATE SCH PUFF: 108 INHALANT RESPIRATORY (INHALATION) at 23:40

## 2021-02-06 RX ADMIN — GUAIFENESIN AND DEXTROMETHORPHAN PRN ML: 100; 10 SYRUP ORAL at 13:41

## 2021-02-06 RX ADMIN — Medication SCH MG: at 08:30

## 2021-02-06 RX ADMIN — Medication SCH UNITS: at 08:30

## 2021-02-06 RX ADMIN — GUAIFENESIN AND DEXTROMETHORPHAN PRN ML: 100; 10 SYRUP ORAL at 22:53

## 2021-02-06 RX ADMIN — ALBUTEROL SULFATE SCH PUFF: 108 INHALANT RESPIRATORY (INHALATION) at 17:37

## 2021-02-06 RX ADMIN — MOMETASONE FUROATE AND FORMOTEROL FUMARATE DIHYDRATE SCH PUFF: 200; 5 AEROSOL RESPIRATORY (INHALATION) at 07:02

## 2021-02-06 RX ADMIN — ASPIRIN 81 MG CHEWABLE TABLET SCH MG: 81 TABLET CHEWABLE at 08:30

## 2021-02-06 RX ADMIN — ALBUTEROL SULFATE SCH PUFF: 108 INHALANT RESPIRATORY (INHALATION) at 07:02

## 2021-02-06 RX ADMIN — ALBUTEROL SULFATE SCH PUFF: 108 INHALANT RESPIRATORY (INHALATION) at 11:02

## 2021-02-06 RX ADMIN — ALBUTEROL SULFATE SCH PUFF: 108 INHALANT RESPIRATORY (INHALATION) at 04:42

## 2021-02-06 RX ADMIN — GUAIFENESIN AND DEXTROMETHORPHAN PRN ML: 100; 10 SYRUP ORAL at 08:42

## 2021-02-06 RX ADMIN — MOMETASONE FUROATE AND FORMOTEROL FUMARATE DIHYDRATE SCH PUFF: 200; 5 AEROSOL RESPIRATORY (INHALATION) at 17:37

## 2021-02-06 RX ADMIN — THERA TABS SCH TAB: TAB at 08:30

## 2021-02-06 RX ADMIN — INSULIN LISPRO PRN UNITS: 100 INJECTION, SOLUTION INTRAVENOUS; SUBCUTANEOUS at 12:12

## 2021-02-06 NOTE — PDOC.HOSPP
- Subjective


Encounter Date: 02/06/21


Encounter Time: 14:30


Subjective: 


Patient seen and examined for respiratory failure due to COVID-19 pneumonia.  

Shortness of breath improving.  Patient is able to ambulate in the room to some 

extent 





- Objective


Vital Signs & Weight: 


                             Vital Signs (12 hours)











  Temp Pulse Resp BP Pulse Ox


 


 02/06/21 16:15  98.2 F  80  20  105/62  94 L


 


 02/06/21 12:00  97.7 F  78  22 H  133/68  95


 


 02/06/21 08:30      95


 


 02/06/21 08:27  97.7 F  91  20  107/64  95








                                     Weight











Weight                         209 lb 7.04 oz














I&O: 


                                        











 02/05/21 02/06/21 02/07/21





 06:59 06:59 06:59


 


Intake Total 570  800


 


Output Total 2850 1580 


 


Balance -2280 -1580 800











Result Diagrams: 


                                 02/06/21 05:53





                                 02/06/21 05:53


Additional Labs: 


                                   Accuchecks











  02/06/21 02/06/21 02/06/21





  16:06 11:11 04:32


 


POC Glucose  111 H  158 H  148 H














  02/05/21





  20:35


 


POC Glucose  135 H














Hospitalist ROS





- Review of Systems


Cardiovascular: denies: chest pain, palpitations, orthopnea, paroxysmal noc. 

dyspnea, edema, light headedness, other


Gastrointestinal: denies: nausea, vomiting, abdominal pain, diarrhea, 

constipation, melena, hematochezia, other





- Medication


Medications: 


Active Medications











Generic Name Dose Route Start Last Admin





  Trade Name Freq  PRN Reason Stop Dose Admin


 


Acetaminophen  650 mg  01/30/21 02:34  02/05/21 00:03





  Acetaminophen 325 Mg Tab  PO   650 mg





  Q4H PRN   Administration





  Headache/Fever/Mild Pain (1-3)  


 


Albuterol Sulfate  2 puff  01/30/21 14:30  02/06/21 17:37





  Albuterol 200 Puff (6.7gm Inhaler)  INH   2 puff





  [id number] JYOTI   Administration


 


Ascorbic Acid  500 mg  01/30/21 09:00  02/06/21 08:30





  Ascorbic Acid 500 Mg Chewable Tablet  PO   500 mg





  DAILY JYOTI   Administration


 


Aspirin  81 mg  01/30/21 09:00  02/06/21 08:30





  Aspirin Chewable 81 Mg Tab  PO   81 mg





  DAILY JYOTI   Administration


 


Atorvastatin Calcium  10 mg  01/30/21 09:00  02/06/21 08:30





  Atorvastatin Calcium 10 Mg Tab  PO   10 mg





  DAILY JYOTI   Administration


 


Cholecalciferol  2,000 units  01/30/21 09:00  02/06/21 08:30





  Cholecalciferol 1,000 Units (25 Mcg) Tab  PO   2,000 units





  DAILY JYOTI   Administration


 


Dexamethasone  6 mg  01/30/21 21:00  02/05/21 20:38





  Dexamethasone 4 Mg/Ml Vial  SLOW IVP   6 mg





  HS JYOTI   Administration


 


Doxycycline Hyclate  100 mg  02/05/21 09:00  02/06/21 08:30





  Doxycycline 100 Mg Cap  PO   100 mg





  BID JYOTI   Administration


 


Enoxaparin Sodium  40 mg  01/30/21 21:00  02/06/21 08:30





  Enoxaparin Sodium 40 Mg/0.4 Ml Syringe  SC   40 mg





  0900,2100 JYOTI   Administration


 


Guaifenesin/Dextromethorphan  15 ml  01/30/21 02:34  02/06/21 18:22





  Guaifenesin Dm 100-10/5 Ml Udcup  PO   15 ml





  Q4H PRN   Administration





  Cough  


 


Insulin Human Lispro  0 units  01/30/21 02:48  02/06/21 12:12





  Humalog 300 Units/3 Ml Vial  SC   2 units





  .MILD SLIDING SCALE PRN   Administration





  Mild Correctional Scale  


 


Insulin Human Lispro  0 units  01/30/21 12:51  01/30/21 20:56





  Humalog 300 Units/3 Ml Vial  SC   2 unit





  .BEDTIME SLIDING SC PRN   Administration





  Bedtime Correctional Scale  


 


Loratadine  10 mg  02/04/21 21:00  02/05/21 20:39





  Loratadine 10 Mg Tab  PO   10 mg





  HS JYOTI   Administration


 


Metformin HCl  500 mg  02/01/21 08:00  02/06/21 17:23





  Metformin 500 Mg Tab  PO   500 mg





  BID-WM JYOTI   Administration


 


Mometasone Furoate/Formoterol Fumar  2 puff  02/05/21 06:30  02/06/21 17:37





  Mometasone 200 Mcg/Formoterol 5 Mcg 120 Puff Inhaler  INH   2 puff





  BID-RT JYOTI   Administration


 


Multivitamins  1 tab  01/31/21 09:00  02/06/21 08:30





  Multivit, Therapeutic 1 Tab  PO   1 tab





  DAILY JYOTI   Administration


 


Pantoprazole Sodium  40 mg  01/31/21 21:00  02/05/21 20:38





  Pantoprazole 40 Mg Tab  PO   40 mg





  HS JYOTI   Administration


 


Zinc Sulfate  220 mg  01/30/21 09:00  02/06/21 08:30





  Zinc Sulfate 220 Mg Cap  PO   220 mg





  DAILY JYOTI   Administration














Hospitalist Exam


Vitals: 


                             Vital Signs (12 hours)











  Temp Pulse Resp BP Pulse Ox


 


 02/06/21 16:15  98.2 F  80  20  105/62  94 L


 


 02/06/21 12:00  97.7 F  78  22 H  133/68  95


 


 02/06/21 08:30      95


 


 02/06/21 08:27  97.7 F  91  20  107/64  95








                                     Weight











Weight                         209 lb 7.04 oz














General Appearance: awake alert


Heart: RRR, no gallops


Respiratory: no wheezes, rales, rhonchi


Gastrointestinal: soft, non-distended


Extremities: no cyanosis





Hosp A/P


(1) Acute respiratory failure with hypoxia


Code(s): J96.01 - ACUTE RESPIRATORY FAILURE WITH HYPOXIA   Status: Acute   





(2) Pneumonia due to COVID-19 virus


Code(s): U07.1 - COVID-19; J12.82 - PNEUMONIA DUE TO CORONAVIRUS DISEASE 2019   

Status: Acute   





(3) Severe sepsis


Code(s): A41.9 - SEPSIS, UNSPECIFIED ORGANISM; R65.20 - SEVERE SEPSIS WITHOUT 

SEPTIC SHOCK   Status: Suspected   





(4) Diabetes mellitus, type II


Status: Chronic   


Qualifiers: 


   Chronic kidney disease stage: stage 2 (mild) 





(5) Obesity (BMI 30-39.9)


Code(s): E66.9 - OBESITY, UNSPECIFIED   Status: Chronic   





(6) Hypertension


Code(s): I10 - ESSENTIAL (PRIMARY) HYPERTENSION   Status: Chronic   





- Plan


DVT proph w/lovenox, DVT proph w/SCDs





Patient has completed remdesivir.  Wean O2 as tolerated.  Continue dexamethasone

with bronchodilators. Continue Dulera and doxycycline.  Continue 

bronchodilators.  Continue Metformin with sliding scale.  Continue DVT and GI 

prophylaxis.  DC planning in 2 to 3 days if stable.  Patient will require home 

oxygen at discharge

## 2021-02-07 VITALS — DIASTOLIC BLOOD PRESSURE: 72 MMHG | SYSTOLIC BLOOD PRESSURE: 120 MMHG | TEMPERATURE: 98.8 F

## 2021-02-07 LAB
ANION GAP SERPL CALC-SCNC: 12 MMOL/L (ref 10–20)
BASOPHILS # BLD AUTO: 0.1 THOU/UL (ref 0–0.2)
BASOPHILS NFR BLD AUTO: 0.5 % (ref 0–1)
BUN SERPL-MCNC: 16 MG/DL (ref 8.4–25.7)
CALCIUM SERPL-MCNC: 9 MG/DL (ref 7.8–10.44)
CHLORIDE SERPL-SCNC: 102 MMOL/L (ref 98–107)
CO2 SERPL-SCNC: 25 MMOL/L (ref 22–29)
CREAT CL PREDICTED SERPL C-G-VRATE: 124 ML/MIN (ref 70–130)
EOSINOPHIL # BLD AUTO: 0.1 THOU/UL (ref 0–0.7)
EOSINOPHIL NFR BLD AUTO: 0.4 % (ref 0–10)
GLUCOSE SERPL-MCNC: 129 MG/DL (ref 70–105)
HGB BLD-MCNC: 11.8 G/DL (ref 14–18)
LYMPHOCYTES # BLD: 0.6 THOU/UL (ref 1.2–3.4)
LYMPHOCYTES NFR BLD AUTO: 5.1 % (ref 21–51)
MCH RBC QN AUTO: 27 PG (ref 27–31)
MCV RBC AUTO: 89 FL (ref 78–98)
MONOCYTES # BLD AUTO: 0.5 THOU/UL (ref 0.11–0.59)
MONOCYTES NFR BLD AUTO: 3.9 % (ref 0–10)
NEUTROPHILS # BLD AUTO: 11 THOU/UL (ref 1.4–6.5)
NEUTROPHILS NFR BLD AUTO: 90.2 % (ref 42–75)
PLATELET # BLD AUTO: 492 THOU/UL (ref 130–400)
POTASSIUM SERPL-SCNC: 5 MMOL/L (ref 3.5–5.1)
RBC # BLD AUTO: 4.37 MILL/UL (ref 4.7–6.1)
SODIUM SERPL-SCNC: 134 MMOL/L (ref 136–145)
WBC # BLD AUTO: 12.2 THOU/UL (ref 4.8–10.8)

## 2021-02-07 RX ADMIN — THERA TABS SCH TAB: TAB at 08:36

## 2021-02-07 RX ADMIN — ALBUTEROL SULFATE SCH PUFF: 108 INHALANT RESPIRATORY (INHALATION) at 15:00

## 2021-02-07 RX ADMIN — GUAIFENESIN AND DEXTROMETHORPHAN PRN ML: 100; 10 SYRUP ORAL at 05:10

## 2021-02-07 RX ADMIN — Medication SCH UNITS: at 08:36

## 2021-02-07 RX ADMIN — ASPIRIN 81 MG CHEWABLE TABLET SCH MG: 81 TABLET CHEWABLE at 08:36

## 2021-02-07 RX ADMIN — GUAIFENESIN AND DEXTROMETHORPHAN PRN ML: 100; 10 SYRUP ORAL at 11:59

## 2021-02-07 RX ADMIN — MOMETASONE FUROATE AND FORMOTEROL FUMARATE DIHYDRATE SCH PUFF: 200; 5 AEROSOL RESPIRATORY (INHALATION) at 05:29

## 2021-02-07 RX ADMIN — ALBUTEROL SULFATE SCH PUFF: 108 INHALANT RESPIRATORY (INHALATION) at 02:33

## 2021-02-07 RX ADMIN — Medication SCH MG: at 08:36

## 2021-02-07 RX ADMIN — ALBUTEROL SULFATE SCH PUFF: 108 INHALANT RESPIRATORY (INHALATION) at 05:29

## 2021-02-07 RX ADMIN — ALBUTEROL SULFATE SCH PUFF: 108 INHALANT RESPIRATORY (INHALATION) at 11:59

## 2021-02-07 NOTE — PDOC.DS.DS
Provider


Date of Admission: 


01/30/21 00:18





Date of Discharge: 02/07/21


Admitting Provider: 


Thierry Curtis MD





Primary Care Physician: 


ROXANNE Paredes








Course


Hospital Course: 


Patient is a 60-year-old male with diabetes mellitus type 2, hypertension and 

obesity presented to the emergency room with fever, cough along with shortness 

of breath.  He was diagnosed with COVID-19 1 week prior to admission.  His 

initial O2 saturation was 88% on room air.  Please referred to the history and 

physical for further details.





The patient was admitted to the hospital with a diagnosis of acute hypoxic 

respiratory failure due to COVID-19 pneumonia along with severe sepsis.  He was 

started on O2 supplementation along with dexamethasone, remdesivir as well as 

bronchodilators.  Empiric antibiotics were also started.  Symptomatically 

patient is significantly improved.  His inflammatory markers are gradually 

improving.  Patient is on 2 L oxygen on the day of discharge.  He will require 

home oxygen prior to discharge.  Due to hyperkalemia he was advised to hold ACE 

inhibitor.  Repeat labs after 1 week is recommended primary care physician 

advised to follow.  He appears stable for discharge.





Resuscitation Status: 








01/30/21 02:33


Resuscitation Status Routine 


   Resuscitation Status: FULL: Full Resuscitation








Lab Results: 


                                        





                                 02/07/21 06:36 





                                 02/07/21 06:36 





Abnormal Lab Results - Last 48 hrs





02/06/21 05:53: WBC 12.9 H, RBC 4.29 L, Hgb 12.5 L, Hct 38.0 L, Plt Count 452 H,

MPV 7.1 L, Neutrophils % 92.1 H, Lymphocytes % 3.9 L, Neutrophils # 11.8 H, 

Lymphocytes # 0.5 L


02/06/21 05:53: Sodium 133 L, C-Reactive Protein 5.86 H, Albumin 3.2 L, 

Albumin/Globulin Ratio 1.0 L


02/06/21 05:53: Ferritin 411.36 H


02/06/21 05:53: D-Dimer 1.12 H


02/07/21 06:36: Sodium 134 L


02/07/21 06:36: WBC 12.2 H, RBC 4.37 L, Hgb 11.8 L, Hct 38.8 L, MCHC 30.4 L, Plt

Count 492 H, MPV 7.1 L, Neutrophils % 90.2 H, Lymphocytes % 5.1 L, Neutrophils #

11.0 H, Lymphocytes # 0.6 L








Vitals: 


                             Vital Signs (12 hours)











  Temp Pulse Resp BP BP Pulse Ox


 


 02/07/21 18:20  98.8 F  93  18   120/72  98


 


 02/07/21 12:00  97.9 F  78  18  103/64   98


 


 02/07/21 08:00  98.0 F  83  20   109/70  92 L








                                     Weight











Weight                         209 lb 7.04 oz














Physical Exam: 


The patient was seen and examined on the day of discharge.





General Appearance: awake alert


Neck: supple, no JVD


Respiratory: no wheezes, rales, rhonchi


Cardiovascular: RRR, no gallops


Gastrointestinal: soft, non-distended, normal bowel sounds


Extremities: no cyanosis


Neurological: no new deficit





Problem


(1) Acute respiratory failure with hypoxia


Code(s): J96.01 - ACUTE RESPIRATORY FAILURE WITH HYPOXIA   Status: Acute   





(2) Pneumonia due to COVID-19 virus


Code(s): U07.1 - COVID-19; J12.82 - PNEUMONIA DUE TO CORONAVIRUS DISEASE 2019   

Status: Acute   





(3) Severe sepsis


Code(s): A41.9 - SEPSIS, UNSPECIFIED ORGANISM; R65.20 - SEVERE SEPSIS WITHOUT 

SEPTIC SHOCK   Status: Suspected   





(4) Diabetes mellitus, type II


Status: Chronic   


Qualifiers: 


   Chronic kidney disease stage: stage 2 (mild) 





(5) Obesity (BMI 30-39.9)


Code(s): E66.9 - OBESITY, UNSPECIFIED   Status: Chronic   





(6) Hypertension


Code(s): I10 - ESSENTIAL (PRIMARY) HYPERTENSION   Status: Chronic   





Plan


Prescriptions: 


Dexamethasone 6 mg PO ASDIR #7 tablet


Pantoprazole [Protonix] 40 mg PO HS #30 tab


Albuterol Sulfate HFA (OR) [Proventil Hfa (or)] 2 puff INH Q4H #1 inh


Doxycycline [Vibramycin] 100 mg PO BID #10 cap


Ascorbic Acid [Vitamin C] 1,000 mg PO DAILY #14 tablet


Zinc Sulfate 220 mg PO DAILY #14 cap


Home Medications: 


                                        











 Medication  Instructions  Recorded  Confirmed  Type


 


Aspirin 81 mg PO DAILY 01/30/21 01/30/21 History


 


Atorvastatin Calcium 10 mg PO DAILY 01/30/21 01/30/21 History


 


metFORMIN [Glucophage] 500 mg PO DAILY 01/30/21 01/30/21 History


 


Albuterol Sulfate HFA (OR) 2 puff INH Q4H #1 inh 02/07/21  Rx





[Proventil Hfa (or)]    


 


Ascorbic Acid [Vitamin C] 1,000 mg PO DAILY #14 tablet 02/07/21  Rx


 


Dexamethasone 6 mg PO ASDIR #7 tablet 02/07/21  Rx


 


Doxycycline [Vibramycin] 100 mg PO BID #10 cap 02/07/21  Rx


 


Multivit, Therapeutic [Theragran] 1 tab PO DAILY  tab 02/07/21  Rx


 


Pantoprazole [Protonix] 40 mg PO HS #30 tab 02/07/21  Rx


 


Zinc Sulfate 220 mg PO DAILY #14 cap 02/07/21  Rx











Allergies: 








No Known Allergies Allergy (Unverified 01/30/21 01:31)


   





Discharge Instructions:: 


BMP after 1 week - PCP to arrange/follow





YOUR PRESCRIPTIONS WERE SENT TO:


[facility]


[address] [place] [id number] 


[phone]


Referrals: 


Sofie Li PA [Primary Care Provider] - 3 Days


Adalid Vilchis MD [Active] - 7 Days


Disposition: HOME





Quality


CORE MEASURES:: N/A

## 2023-10-12 NOTE — PQF
CLINICAL DOCUMENTATION CLARIFICATION FORM:





Dear Dr. Zion Hahn                  Date:   2/5/21



Please exercise your independent, professional judgment in responding to the 
clarification form. 

Clinical indicators are provided on the bottom of this form for your review.



Please check appropriate box(es) to clarify if the following diagnosis has been 
ruled in or ruled out:



         Severe Sepsis, unspecified, without septic shock, Suspected

[ x ] Ruled in diagnosis

     [ x ] Continue to treat        [  ] Resolved

[  ] Ruled out diagnosis

[  ] Improving

[  ] Cannot rule out diagnosis

[  ] Other diagnosis ___________

[  ] Unable to determine



In addition, please specify:

Present on Admission (POA):  [x  ] Yes             [  ] No             [  ] 
Unable to determine



For continuity of documentation, please document condition throughout progress 
notes and discharge summary.  Thank You.



To be completed by CDI/Coding staff for physician review: 



CLINICAL INDICATORS - SIGNS / SYMPTOMS / LABS  / RESULTS AND LOCATION IN MR

Severe sepsis, unspecified, without septic shock, Suspected (PN 1/31 - present)

WBC:  17.3, 11.8, 13.9, 10.2, 15.7 (Lab EMR 1/31 - 2/5)

Neutrophils:  87%, 81%, 79%, 90% (Lab EMR 1/31 - 2/5)

CRP:  8.40, 12.85, 15.45 (Lab EMR 1/31, 2/2, 2/4)



RISK FACTORS   / RESULTS AND LOCATION IN MR

Pneumonia d/t COVID 19 (PN 1/31 - present)

Acute respiratory failure w/hypoxia (PN 1/3 - present)



TREATMENTS    / RESULTS AND LOCATION IN MR

Infectious Disease consult (Physician order EMR, 1/30)

Supplemental oxygen (Physician order EMR, 1/30)







Thank you!



CDS Signature:  Jeniffer Metcalf RN, BSN       Phone #:  [phone]             Date: 
2/5/21



                 This is a permanent part of the Medical Record

[facility]